# Patient Record
Sex: FEMALE | Race: BLACK OR AFRICAN AMERICAN | NOT HISPANIC OR LATINO | Employment: OTHER | URBAN - METROPOLITAN AREA
[De-identification: names, ages, dates, MRNs, and addresses within clinical notes are randomized per-mention and may not be internally consistent; named-entity substitution may affect disease eponyms.]

---

## 2023-10-21 ENCOUNTER — HOSPITAL ENCOUNTER (EMERGENCY)
Facility: HOSPITAL | Age: 78
Discharge: HOME | End: 2023-10-21
Payer: MEDICARE

## 2023-10-21 VITALS
RESPIRATION RATE: 16 BRPM | HEART RATE: 70 BPM | SYSTOLIC BLOOD PRESSURE: 162 MMHG | HEIGHT: 64 IN | OXYGEN SATURATION: 97 % | WEIGHT: 166 LBS | DIASTOLIC BLOOD PRESSURE: 85 MMHG | TEMPERATURE: 98.4 F | BODY MASS INDEX: 28.34 KG/M2

## 2023-10-21 DIAGNOSIS — R21 RASH AND OTHER NONSPECIFIC SKIN ERUPTION: Primary | ICD-10-CM

## 2023-10-21 LAB
APPEARANCE UR: CLEAR
BILIRUB UR STRIP.AUTO-MCNC: NEGATIVE MG/DL
COLOR UR: COLORLESS
GLUCOSE UR STRIP.AUTO-MCNC: ABNORMAL MG/DL
KETONES UR STRIP.AUTO-MCNC: NEGATIVE MG/DL
LEUKOCYTE ESTERASE UR QL STRIP.AUTO: NEGATIVE
NITRITE UR QL STRIP.AUTO: NEGATIVE
PH UR STRIP.AUTO: 7 [PH]
PROT UR STRIP.AUTO-MCNC: NEGATIVE MG/DL
RBC # UR STRIP.AUTO: NEGATIVE /UL
SP GR UR STRIP.AUTO: 1
UROBILINOGEN UR STRIP.AUTO-MCNC: <2 MG/DL

## 2023-10-21 PROCEDURE — 2500000004 HC RX 250 GENERAL PHARMACY W/ HCPCS (ALT 636 FOR OP/ED)

## 2023-10-21 PROCEDURE — 81003 URINALYSIS AUTO W/O SCOPE: CPT | Performed by: PHYSICIAN ASSISTANT

## 2023-10-21 PROCEDURE — 2500000001 HC RX 250 WO HCPCS SELF ADMINISTERED DRUGS (ALT 637 FOR MEDICARE OP)

## 2023-10-21 PROCEDURE — 99283 EMERGENCY DEPT VISIT LOW MDM: CPT

## 2023-10-21 RX ORDER — BACITRACIN ZINC 500 UNIT/G
OINTMENT IN PACKET (EA) TOPICAL
Status: COMPLETED
Start: 2023-10-21 | End: 2023-10-21

## 2023-10-21 RX ORDER — DEXAMETHASONE 4 MG/1
6 TABLET ORAL ONCE
Status: COMPLETED | OUTPATIENT
Start: 2023-10-21 | End: 2023-10-21

## 2023-10-21 RX ORDER — BACITRACIN ZINC 500 UNIT/G
OINTMENT IN PACKET (EA) TOPICAL ONCE
Status: COMPLETED | OUTPATIENT
Start: 2023-10-21 | End: 2023-10-21

## 2023-10-21 RX ORDER — MUPIROCIN CALCIUM 20 MG/G
1 CREAM TOPICAL 3 TIMES DAILY
Qty: 3 G | Refills: 0 | Status: SHIPPED | OUTPATIENT
Start: 2023-10-21 | End: 2023-10-31

## 2023-10-21 RX ORDER — DEXAMETHASONE 6 MG/1
TABLET ORAL
Status: COMPLETED
Start: 2023-10-21 | End: 2023-10-21

## 2023-10-21 RX ADMIN — Medication 1 APPLICATION: at 18:32

## 2023-10-21 RX ADMIN — DEXAMETHASONE 6 MG: 4 TABLET ORAL at 18:31

## 2023-10-21 RX ADMIN — BACITRACIN 1 APPLICATION: 500 OINTMENT TOPICAL at 18:32

## 2023-10-21 RX ADMIN — DEXAMETHASONE 6 MG: 6 TABLET ORAL at 18:31

## 2023-10-21 ASSESSMENT — COLUMBIA-SUICIDE SEVERITY RATING SCALE - C-SSRS
6. HAVE YOU EVER DONE ANYTHING, STARTED TO DO ANYTHING, OR PREPARED TO DO ANYTHING TO END YOUR LIFE?: NO
1. IN THE PAST MONTH, HAVE YOU WISHED YOU WERE DEAD OR WISHED YOU COULD GO TO SLEEP AND NOT WAKE UP?: NO
2. HAVE YOU ACTUALLY HAD ANY THOUGHTS OF KILLING YOURSELF?: NO

## 2023-10-21 NOTE — ED PROVIDER NOTES
HPI   Chief Complaint   Patient presents with    Rash     Pt presents to ED for KAY LE red raised itchy rash with unknown source x3 days. Airway patent. Pt verbalizes her eyes/ears feel swollen.        This is a 78-year-old female coming in for 2 areas of possible skin infection or allergic reaction as well as urinary frequency.  See MDM      History provided by:  Patient                      No data recorded                Patient History   No past medical history on file.  No past surgical history on file.  No family history on file.  Social History     Tobacco Use    Smoking status: Not on file    Smokeless tobacco: Not on file   Substance Use Topics    Alcohol use: Not on file    Drug use: Not on file       Physical Exam   ED Triage Vitals [10/21/23 1707]   Temp Heart Rate Resp BP   36.9 °C (98.4 °F) 67 20 162/85      SpO2 Temp Source Heart Rate Source Patient Position   98 % Oral -- --      BP Location FiO2 (%)     -- --       Physical Exam  Vitals and nursing note reviewed.   Constitutional:       General: She is not in acute distress.     Appearance: Normal appearance. She is not ill-appearing or toxic-appearing.   HENT:      Head: Normocephalic and atraumatic.   Eyes:      Extraocular Movements: Extraocular movements intact.      Conjunctiva/sclera: Conjunctivae normal.      Pupils: Pupils are equal, round, and reactive to light.   Cardiovascular:      Rate and Rhythm: Regular rhythm.      Pulses: Normal pulses.      Heart sounds: Normal heart sounds.   Pulmonary:      Effort: Pulmonary effort is normal. No respiratory distress.      Breath sounds: Normal breath sounds.   Abdominal:      General: Abdomen is flat. There is no distension.   Musculoskeletal:         General: Normal range of motion.      Cervical back: Normal range of motion and neck supple.   Skin:     General: Skin is warm and dry.      Comments: 2 small erythematous approximately quarter sized areas with a center pustule.  1 present to the  right posterior proximal calf just below the knee.  The other one present to the lateral posterior left lower extremity.  They are nontender to palpation.   Neurological:      General: No focal deficit present.      Mental Status: She is alert and oriented to person, place, and time.   Psychiatric:         Mood and Affect: Mood normal.         Behavior: Behavior normal.         Thought Content: Thought content normal.         ED Course & MDM   Diagnoses as of 10/21/23 1825   Rash and other nonspecific skin eruption       Medical Decision Making  Summary:  Medical Decision Making:   Patient presented as described in HPI. Patient case including ROS, PE, and treatment and plan discussed with ED attending if attached as cosigner. Results from labs and or imaging included below if completed. Liv Hoskins  is a 78 y.o. coming in for Patient presents with:  Rash: Pt presents to ED for KAY LE red raised itchy rash with unknown source x3 days. Airway patent. Pt verbalizes her eyes/ears feel swollen.  Patient is 78-year-old female coming for evaluation of 2 small erythematous pustules that are present to the legs.  She states she had one on the left forearm that is since improved.  She states that they were pruritic at first.  They have not been tender.  She denies fevers or chills.  Patient reports that she has no drainage of the area.  She is also concerned that she may be having allergic reaction that started on Thursday.  She states she has multiple allergies and feels at times like her throat is swelling in her eye swelling.  She denies any difficulty breathing.  No difficulty swallowing.  Patient's been taking Benadryl.  Urine was completed on patient as she also complains of urinary frequency.  Urine showed glucose.  Point-of-care blood glucose was completed.  Patient has multiple complaints that she is complaining about.  She states that she has had lower leg edema as well as generalized weakness to the lower legs  and believes it is related to the statin.  Patient was advised to discontinue her statin and she will be treated with topical bacitracin to her areas of complaint today as well as discharged on Bactroban.  She will be given a dose of dexamethasone here and advised close follow-up with a PCP.  .     Patient was advised to follow up with PCP or recommended provider in 2-3 days for another evaluation and exam. I advised patient/guardian to return or go to closest emergency room immediately if symptoms change, get worse, new symptoms develop prior to follow up. If there is no improvement in symptoms in the next 24 hours they are advised to return for further evaluation and exam. I also explained the plan and treatment course. Patient/guardian is in agreement with plan, treatment course, and follow up and states verbally that they will comply.    Labs Reviewed  URINALYSIS WITH REFLEX MICROSCOPIC AND CULTURE - Abnormal     Color, Urine                  Colorless (*)               Appearance, Urine             Clear                  Specific Gravity, Urine       1.003 (*)               pH, Urine                     7.0                    Protein, Urine                NEGATIVE                Glucose, Urine                >=500 (3+) (*)               Blood, Urine                  NEGATIVE                Ketones, Urine                NEGATIVE                Bilirubin, Urine              NEGATIVE                Urobilinogen, Urine           <2.0                   Nitrite, Urine                NEGATIVE                Leukocyte Esterase, Urine     NEGATIVE             URINALYSIS WITH REFLEX MICROSCOPIC AND CULTURE       Narrative: The following orders were created for panel order Urinalysis with Reflex Microscopic and Culture.                Procedure                               Abnormality         Status                                   ---------                               -----------         ------                                    Urinalysis with Reflex M...[[id number]]  Abnormal            Final result                             Extra Urine Gray Tube[[id number]]                                                                                     Please view results for these tests on the individual orders.  EXTRA URINE GRAY TUBE  POCT GLUCOSE METER   No orders to display       Tests/Medications/Escalations of Care considered but not given: As in MDM    Patient care discussed with: N/A  Social Determinants affecting care: N/A    Final diagnosis and disposition as documented       Homegoing. I discussed the differential; results and discharge plan with the patient and/or family/friend/caregiver if present.  I emphasized the importance of follow-up with the physician I referred them to in the timeframe recommended.  I explained reasons for the patient to return to the Emergency Department. They agreed that if they feel their condition is worsening or if they have any other concern they should call 911 immediately for further assistance. I gave the patient an opportunity to ask all questions they had and answered all of them accordingly. They understand return precautions and discharge instructions. The patient and/or family/friend/caregiver expressed understanding verbally and that they would comply.     Disposition: Discharge      This note has been transcribed using voice recognition and may contain grammatical errors, misplaced words, incorrect words, incorrect phrases or other errors.          Procedure  Procedures     Jonathan Tripp PA-C  10/21/23 5504

## 2023-10-21 NOTE — ED NOTES
Patient ready for discharge. Bacitracin and band aids applied to rash on legs as ordered by provider. Reviewed discharge instructions with patient. Patient states no questions or concerns at this time. Informed patient of follow-up information. Prescription given to patient. Proper medication administration reviewed and patient states no questions associated with prescription. Patient ambulatory from ED, VSS, NAD noted at this time, ABC's intact.       Gi Knott LPN  10/21/23 0431

## 2024-06-01 ENCOUNTER — APPOINTMENT (OUTPATIENT)
Dept: CARDIOLOGY | Facility: HOSPITAL | Age: 79
End: 2024-06-01
Payer: MEDICARE

## 2024-06-01 ENCOUNTER — APPOINTMENT (OUTPATIENT)
Dept: RADIOLOGY | Facility: HOSPITAL | Age: 79
End: 2024-06-01
Payer: MEDICARE

## 2024-06-01 ENCOUNTER — HOSPITAL ENCOUNTER (OUTPATIENT)
Facility: HOSPITAL | Age: 79
Setting detail: OBSERVATION
Discharge: HOME | End: 2024-06-03
Attending: EMERGENCY MEDICINE | Admitting: INTERNAL MEDICINE
Payer: MEDICARE

## 2024-06-01 DIAGNOSIS — R94.31 ABNORMAL EKG: ICD-10-CM

## 2024-06-01 DIAGNOSIS — R00.2 PALPITATIONS: Primary | ICD-10-CM

## 2024-06-01 DIAGNOSIS — E83.42 HYPOMAGNESEMIA: ICD-10-CM

## 2024-06-01 DIAGNOSIS — N39.0 ACUTE LOWER UTI: ICD-10-CM

## 2024-06-01 DIAGNOSIS — R79.89 ELEVATED TROPONIN I LEVEL: ICD-10-CM

## 2024-06-01 LAB
ALBUMIN SERPL BCP-MCNC: 3.7 G/DL (ref 3.4–5)
ALP SERPL-CCNC: 71 U/L (ref 33–136)
ALT SERPL W P-5'-P-CCNC: 14 U/L (ref 7–45)
ANION GAP SERPL CALC-SCNC: 15 MMOL/L (ref 10–20)
APPEARANCE UR: CLEAR
AST SERPL W P-5'-P-CCNC: 24 U/L (ref 9–39)
BASOPHILS # BLD AUTO: 0.06 X10*3/UL (ref 0–0.1)
BASOPHILS NFR BLD AUTO: 0.5 %
BILIRUB DIRECT SERPL-MCNC: 0.2 MG/DL (ref 0–0.3)
BILIRUB SERPL-MCNC: 0.8 MG/DL (ref 0–1.2)
BILIRUB UR STRIP.AUTO-MCNC: NEGATIVE MG/DL
BNP SERPL-MCNC: 33 PG/ML (ref 0–99)
BUN SERPL-MCNC: 11 MG/DL (ref 6–23)
CALCIUM SERPL-MCNC: 9.2 MG/DL (ref 8.6–10.3)
CARDIAC TROPONIN I PNL SERPL HS: 10 NG/L (ref 0–13)
CARDIAC TROPONIN I PNL SERPL HS: 18 NG/L (ref 0–13)
CARDIAC TROPONIN I PNL SERPL HS: 19 NG/L (ref 0–13)
CHLORIDE SERPL-SCNC: 100 MMOL/L (ref 98–107)
CO2 SERPL-SCNC: 24 MMOL/L (ref 21–32)
COLOR UR: COLORLESS
CREAT SERPL-MCNC: 0.89 MG/DL (ref 0.5–1.05)
EGFRCR SERPLBLD CKD-EPI 2021: 66 ML/MIN/1.73M*2
EOSINOPHIL # BLD AUTO: 0.15 X10*3/UL (ref 0–0.4)
EOSINOPHIL NFR BLD AUTO: 1.3 %
ERYTHROCYTE [DISTWIDTH] IN BLOOD BY AUTOMATED COUNT: 12.6 % (ref 11.5–14.5)
GLUCOSE SERPL-MCNC: 250 MG/DL (ref 74–99)
GLUCOSE UR STRIP.AUTO-MCNC: ABNORMAL MG/DL
HCT VFR BLD AUTO: 39.4 % (ref 36–46)
HGB BLD-MCNC: 11.8 G/DL (ref 12–16)
IMM GRANULOCYTES # BLD AUTO: 0.1 X10*3/UL (ref 0–0.5)
IMM GRANULOCYTES NFR BLD AUTO: 0.9 % (ref 0–0.9)
KETONES UR STRIP.AUTO-MCNC: NEGATIVE MG/DL
LEUKOCYTE ESTERASE UR QL STRIP.AUTO: ABNORMAL
LYMPHOCYTES # BLD AUTO: 2.95 X10*3/UL (ref 0.8–3)
LYMPHOCYTES NFR BLD AUTO: 25.9 %
MAGNESIUM SERPL-MCNC: 1.5 MG/DL (ref 1.6–2.4)
MCH RBC QN AUTO: 29.9 PG (ref 26–34)
MCHC RBC AUTO-ENTMCNC: 29.9 G/DL (ref 32–36)
MCV RBC AUTO: 100 FL (ref 80–100)
MONOCYTES # BLD AUTO: 0.63 X10*3/UL (ref 0.05–0.8)
MONOCYTES NFR BLD AUTO: 5.5 %
NEUTROPHILS # BLD AUTO: 7.49 X10*3/UL (ref 1.6–5.5)
NEUTROPHILS NFR BLD AUTO: 65.9 %
NITRITE UR QL STRIP.AUTO: NEGATIVE
NRBC BLD-RTO: 0 /100 WBCS (ref 0–0)
PH UR STRIP.AUTO: 6.5 [PH]
PLATELET # BLD AUTO: 459 X10*3/UL (ref 150–450)
POTASSIUM SERPL-SCNC: 4 MMOL/L (ref 3.5–5.3)
PROT SERPL-MCNC: 7.6 G/DL (ref 6.4–8.2)
PROT UR STRIP.AUTO-MCNC: NEGATIVE MG/DL
RBC # BLD AUTO: 3.94 X10*6/UL (ref 4–5.2)
RBC # UR STRIP.AUTO: NEGATIVE /UL
RBC #/AREA URNS AUTO: ABNORMAL /HPF
SODIUM SERPL-SCNC: 135 MMOL/L (ref 136–145)
SP GR UR STRIP.AUTO: 1
TSH SERPL-ACNC: 0.73 MIU/L (ref 0.44–3.98)
UROBILINOGEN UR STRIP.AUTO-MCNC: NORMAL MG/DL
WBC # BLD AUTO: 11.4 X10*3/UL (ref 4.4–11.3)
WBC #/AREA URNS AUTO: ABNORMAL /HPF

## 2024-06-01 PROCEDURE — 84443 ASSAY THYROID STIM HORMONE: CPT | Performed by: EMERGENCY MEDICINE

## 2024-06-01 PROCEDURE — 71045 X-RAY EXAM CHEST 1 VIEW: CPT | Performed by: RADIOLOGY

## 2024-06-01 PROCEDURE — 84484 ASSAY OF TROPONIN QUANT: CPT | Performed by: EMERGENCY MEDICINE

## 2024-06-01 PROCEDURE — 71045 X-RAY EXAM CHEST 1 VIEW: CPT

## 2024-06-01 PROCEDURE — 36415 COLL VENOUS BLD VENIPUNCTURE: CPT | Performed by: EMERGENCY MEDICINE

## 2024-06-01 PROCEDURE — 82248 BILIRUBIN DIRECT: CPT | Performed by: EMERGENCY MEDICINE

## 2024-06-01 PROCEDURE — 84484 ASSAY OF TROPONIN QUANT: CPT | Mod: 91 | Performed by: EMERGENCY MEDICINE

## 2024-06-01 PROCEDURE — 83735 ASSAY OF MAGNESIUM: CPT | Performed by: EMERGENCY MEDICINE

## 2024-06-01 PROCEDURE — 83880 ASSAY OF NATRIURETIC PEPTIDE: CPT | Performed by: EMERGENCY MEDICINE

## 2024-06-01 PROCEDURE — 87086 URINE CULTURE/COLONY COUNT: CPT | Mod: AHULAB | Performed by: EMERGENCY MEDICINE

## 2024-06-01 PROCEDURE — 85025 COMPLETE CBC W/AUTO DIFF WBC: CPT | Performed by: EMERGENCY MEDICINE

## 2024-06-01 PROCEDURE — 81003 URINALYSIS AUTO W/O SCOPE: CPT | Performed by: EMERGENCY MEDICINE

## 2024-06-01 PROCEDURE — 81001 URINALYSIS AUTO W/SCOPE: CPT | Performed by: EMERGENCY MEDICINE

## 2024-06-01 PROCEDURE — 99285 EMERGENCY DEPT VISIT HI MDM: CPT | Mod: 25

## 2024-06-01 PROCEDURE — 93005 ELECTROCARDIOGRAM TRACING: CPT

## 2024-06-01 PROCEDURE — 80053 COMPREHEN METABOLIC PANEL: CPT | Performed by: EMERGENCY MEDICINE

## 2024-06-01 NOTE — ED TRIAGE NOTES
Patient arrived via ambulance. The patient had been suffering an elevated BP. The patient was still hypertensive upon delivery to the er. GCS of 15, no falls or injuries.

## 2024-06-02 PROBLEM — N39.0 UTI (URINARY TRACT INFECTION): Status: ACTIVE | Noted: 2024-06-02

## 2024-06-02 PROBLEM — R00.2 PALPITATIONS: Status: ACTIVE | Noted: 2024-06-02

## 2024-06-02 LAB
ANION GAP SERPL CALC-SCNC: 11 MMOL/L (ref 10–20)
BUN SERPL-MCNC: 9 MG/DL (ref 6–23)
CALCIUM SERPL-MCNC: 9.2 MG/DL (ref 8.6–10.3)
CARDIAC TROPONIN I PNL SERPL HS: 16 NG/L (ref 0–13)
CHLORIDE SERPL-SCNC: 102 MMOL/L (ref 98–107)
CO2 SERPL-SCNC: 27 MMOL/L (ref 21–32)
CREAT SERPL-MCNC: 0.85 MG/DL (ref 0.5–1.05)
EGFRCR SERPLBLD CKD-EPI 2021: 70 ML/MIN/1.73M*2
ERYTHROCYTE [DISTWIDTH] IN BLOOD BY AUTOMATED COUNT: 12.7 % (ref 11.5–14.5)
FERRITIN SERPL-MCNC: 334 NG/ML (ref 8–150)
FOLATE SERPL-MCNC: 18.6 NG/ML
GLUCOSE BLD MANUAL STRIP-MCNC: 144 MG/DL (ref 74–99)
GLUCOSE BLD MANUAL STRIP-MCNC: 178 MG/DL (ref 74–99)
GLUCOSE BLD MANUAL STRIP-MCNC: 233 MG/DL (ref 74–99)
GLUCOSE SERPL-MCNC: 171 MG/DL (ref 74–99)
HCT VFR BLD AUTO: 37.3 % (ref 36–46)
HGB BLD-MCNC: 12.1 G/DL (ref 12–16)
HGB RETIC QN: 35 PG (ref 28–38)
IMMATURE RETIC FRACTION: 8.4 %
IRON SATN MFR SERPL: 21 % (ref 25–45)
IRON SERPL-MCNC: 59 UG/DL (ref 35–150)
MAGNESIUM SERPL-MCNC: 1.6 MG/DL (ref 1.6–2.4)
MCH RBC QN AUTO: 30.2 PG (ref 26–34)
MCHC RBC AUTO-ENTMCNC: 32.4 G/DL (ref 32–36)
MCV RBC AUTO: 93 FL (ref 80–100)
NRBC BLD-RTO: 0 /100 WBCS (ref 0–0)
PLATELET # BLD AUTO: 463 X10*3/UL (ref 150–450)
POTASSIUM SERPL-SCNC: 3.8 MMOL/L (ref 3.5–5.3)
RBC # BLD AUTO: 4.01 X10*6/UL (ref 4–5.2)
RETICS #: 0.09 X10*6/UL (ref 0.02–0.11)
RETICS/RBC NFR AUTO: 2.4 % (ref 0.5–2)
SODIUM SERPL-SCNC: 136 MMOL/L (ref 136–145)
TIBC SERPL-MCNC: 280 UG/DL (ref 240–445)
UIBC SERPL-MCNC: 221 UG/DL (ref 110–370)
VIT B12 SERPL-MCNC: 960 PG/ML (ref 211–911)
WBC # BLD AUTO: 13.3 X10*3/UL (ref 4.4–11.3)

## 2024-06-02 PROCEDURE — 83550 IRON BINDING TEST: CPT | Performed by: NURSE PRACTITIONER

## 2024-06-02 PROCEDURE — G0378 HOSPITAL OBSERVATION PER HR: HCPCS

## 2024-06-02 PROCEDURE — 83735 ASSAY OF MAGNESIUM: CPT | Performed by: NURSE PRACTITIONER

## 2024-06-02 PROCEDURE — 2500000001 HC RX 250 WO HCPCS SELF ADMINISTERED DRUGS (ALT 637 FOR MEDICARE OP): Performed by: EMERGENCY MEDICINE

## 2024-06-02 PROCEDURE — 85045 AUTOMATED RETICULOCYTE COUNT: CPT | Performed by: NURSE PRACTITIONER

## 2024-06-02 PROCEDURE — 36415 COLL VENOUS BLD VENIPUNCTURE: CPT | Performed by: NURSE PRACTITIONER

## 2024-06-02 PROCEDURE — 96372 THER/PROPH/DIAG INJ SC/IM: CPT | Mod: 59 | Performed by: INTERNAL MEDICINE

## 2024-06-02 PROCEDURE — 82607 VITAMIN B-12: CPT | Mod: AHULAB | Performed by: NURSE PRACTITIONER

## 2024-06-02 PROCEDURE — 82746 ASSAY OF FOLIC ACID SERUM: CPT | Mod: AHULAB | Performed by: NURSE PRACTITIONER

## 2024-06-02 PROCEDURE — 85027 COMPLETE CBC AUTOMATED: CPT | Performed by: NURSE PRACTITIONER

## 2024-06-02 PROCEDURE — 2500000001 HC RX 250 WO HCPCS SELF ADMINISTERED DRUGS (ALT 637 FOR MEDICARE OP): Performed by: NURSE PRACTITIONER

## 2024-06-02 PROCEDURE — 84484 ASSAY OF TROPONIN QUANT: CPT | Performed by: NURSE PRACTITIONER

## 2024-06-02 PROCEDURE — 83540 ASSAY OF IRON: CPT | Performed by: NURSE PRACTITIONER

## 2024-06-02 PROCEDURE — 2500000004 HC RX 250 GENERAL PHARMACY W/ HCPCS (ALT 636 FOR OP/ED): Performed by: EMERGENCY MEDICINE

## 2024-06-02 PROCEDURE — 82947 ASSAY GLUCOSE BLOOD QUANT: CPT | Mod: 59

## 2024-06-02 PROCEDURE — 2500000004 HC RX 250 GENERAL PHARMACY W/ HCPCS (ALT 636 FOR OP/ED): Performed by: INTERNAL MEDICINE

## 2024-06-02 PROCEDURE — 80048 BASIC METABOLIC PNL TOTAL CA: CPT | Performed by: NURSE PRACTITIONER

## 2024-06-02 PROCEDURE — 96365 THER/PROPH/DIAG IV INF INIT: CPT

## 2024-06-02 PROCEDURE — 82728 ASSAY OF FERRITIN: CPT | Mod: AHULAB | Performed by: NURSE PRACTITIONER

## 2024-06-02 PROCEDURE — 2500000001 HC RX 250 WO HCPCS SELF ADMINISTERED DRUGS (ALT 637 FOR MEDICARE OP): Performed by: INTERNAL MEDICINE

## 2024-06-02 PROCEDURE — 2500000002 HC RX 250 W HCPCS SELF ADMINISTERED DRUGS (ALT 637 FOR MEDICARE OP, ALT 636 FOR OP/ED): Performed by: NURSE PRACTITIONER

## 2024-06-02 RX ORDER — ROSUVASTATIN CALCIUM 5 MG/1
5 TABLET, COATED ORAL 3 TIMES WEEKLY
COMMUNITY

## 2024-06-02 RX ORDER — CEFTRIAXONE 1 G/50ML
1 INJECTION, SOLUTION INTRAVENOUS ONCE
Status: COMPLETED | OUTPATIENT
Start: 2024-06-02 | End: 2024-06-02

## 2024-06-02 RX ORDER — ROSUVASTATIN CALCIUM 10 MG/1
5 TABLET, COATED ORAL 3 TIMES WEEKLY
Status: DISCONTINUED | OUTPATIENT
Start: 2024-06-03 | End: 2024-06-03 | Stop reason: HOSPADM

## 2024-06-02 RX ORDER — LORAZEPAM 2 MG/ML
1 CONCENTRATE ORAL ONCE
Status: DISCONTINUED | OUTPATIENT
Start: 2024-06-02 | End: 2024-06-02

## 2024-06-02 RX ORDER — INSULIN LISPRO 100 [IU]/ML
0-10 INJECTION, SOLUTION INTRAVENOUS; SUBCUTANEOUS
Status: DISCONTINUED | OUTPATIENT
Start: 2024-06-02 | End: 2024-06-03 | Stop reason: HOSPADM

## 2024-06-02 RX ORDER — ACETAMINOPHEN 325 MG/1
650 TABLET ORAL EVERY 4 HOURS PRN
Status: DISCONTINUED | OUTPATIENT
Start: 2024-06-02 | End: 2024-06-03 | Stop reason: HOSPADM

## 2024-06-02 RX ORDER — ACETAMINOPHEN 650 MG/1
650 SUPPOSITORY RECTAL EVERY 4 HOURS PRN
Status: DISCONTINUED | OUTPATIENT
Start: 2024-06-02 | End: 2024-06-03 | Stop reason: HOSPADM

## 2024-06-02 RX ORDER — PANTOPRAZOLE SODIUM 40 MG/10ML
40 INJECTION, POWDER, LYOPHILIZED, FOR SOLUTION INTRAVENOUS
Status: DISCONTINUED | OUTPATIENT
Start: 2024-06-02 | End: 2024-06-03 | Stop reason: HOSPADM

## 2024-06-02 RX ORDER — ENOXAPARIN SODIUM 100 MG/ML
40 INJECTION SUBCUTANEOUS EVERY 24 HOURS
Status: DISCONTINUED | OUTPATIENT
Start: 2024-06-02 | End: 2024-06-03 | Stop reason: HOSPADM

## 2024-06-02 RX ORDER — METFORMIN HYDROCHLORIDE 500 MG/1
1000 TABLET ORAL
COMMUNITY

## 2024-06-02 RX ORDER — LANOLIN ALCOHOL/MO/W.PET/CERES
400 CREAM (GRAM) TOPICAL ONCE
Status: COMPLETED | OUTPATIENT
Start: 2024-06-02 | End: 2024-06-02

## 2024-06-02 RX ORDER — LORAZEPAM 1 MG/1
1 TABLET ORAL ONCE
Status: COMPLETED | OUTPATIENT
Start: 2024-06-02 | End: 2024-06-02

## 2024-06-02 RX ORDER — LOSARTAN POTASSIUM 100 MG/1
100 TABLET ORAL DAILY
COMMUNITY
Start: 2024-03-25

## 2024-06-02 RX ORDER — AMLODIPINE BESYLATE 5 MG/1
5 TABLET ORAL DAILY
COMMUNITY
Start: 2024-03-25

## 2024-06-02 RX ORDER — ONDANSETRON 4 MG/1
4 TABLET, FILM COATED ORAL EVERY 8 HOURS PRN
Status: DISCONTINUED | OUTPATIENT
Start: 2024-06-02 | End: 2024-06-03 | Stop reason: HOSPADM

## 2024-06-02 RX ORDER — CEFTRIAXONE 1 G/50ML
1 INJECTION, SOLUTION INTRAVENOUS EVERY 24 HOURS
Status: DISCONTINUED | OUTPATIENT
Start: 2024-06-03 | End: 2024-06-03

## 2024-06-02 RX ORDER — LATANOPROST 50 UG/ML
1 SOLUTION/ DROPS OPHTHALMIC NIGHTLY
COMMUNITY

## 2024-06-02 RX ORDER — TIMOLOL MALEATE 5 MG/ML
1 SOLUTION/ DROPS OPHTHALMIC DAILY
Status: DISCONTINUED | OUTPATIENT
Start: 2024-06-02 | End: 2024-06-03 | Stop reason: HOSPADM

## 2024-06-02 RX ORDER — AMLODIPINE BESYLATE 5 MG/1
5 TABLET ORAL DAILY
Status: DISCONTINUED | OUTPATIENT
Start: 2024-06-02 | End: 2024-06-03 | Stop reason: HOSPADM

## 2024-06-02 RX ORDER — LOSARTAN POTASSIUM 50 MG/1
100 TABLET ORAL DAILY
Status: DISCONTINUED | OUTPATIENT
Start: 2024-06-02 | End: 2024-06-03 | Stop reason: HOSPADM

## 2024-06-02 RX ORDER — DEXTROSE 50 % IN WATER (D50W) INTRAVENOUS SYRINGE
25
Status: DISCONTINUED | OUTPATIENT
Start: 2024-06-02 | End: 2024-06-03 | Stop reason: HOSPADM

## 2024-06-02 RX ORDER — TIMOLOL MALEATE 5 MG/ML
1 SOLUTION/ DROPS OPHTHALMIC DAILY
COMMUNITY

## 2024-06-02 RX ORDER — DEXTROSE 50 % IN WATER (D50W) INTRAVENOUS SYRINGE
12.5
Status: DISCONTINUED | OUTPATIENT
Start: 2024-06-02 | End: 2024-06-03 | Stop reason: HOSPADM

## 2024-06-02 RX ORDER — ONDANSETRON HYDROCHLORIDE 2 MG/ML
4 INJECTION, SOLUTION INTRAVENOUS EVERY 8 HOURS PRN
Status: DISCONTINUED | OUTPATIENT
Start: 2024-06-02 | End: 2024-06-03 | Stop reason: HOSPADM

## 2024-06-02 RX ORDER — ACETAMINOPHEN 160 MG/5ML
650 SOLUTION ORAL EVERY 4 HOURS PRN
Status: DISCONTINUED | OUTPATIENT
Start: 2024-06-02 | End: 2024-06-03 | Stop reason: HOSPADM

## 2024-06-02 RX ORDER — PANTOPRAZOLE SODIUM 40 MG/1
40 TABLET, DELAYED RELEASE ORAL
Status: DISCONTINUED | OUTPATIENT
Start: 2024-06-02 | End: 2024-06-03 | Stop reason: HOSPADM

## 2024-06-02 RX ORDER — LATANOPROST 50 UG/ML
1 SOLUTION/ DROPS OPHTHALMIC NIGHTLY
Status: DISCONTINUED | OUTPATIENT
Start: 2024-06-02 | End: 2024-06-03 | Stop reason: HOSPADM

## 2024-06-02 RX ORDER — ASPIRIN 325 MG
325 TABLET ORAL ONCE
Status: COMPLETED | OUTPATIENT
Start: 2024-06-02 | End: 2024-06-02

## 2024-06-02 RX ADMIN — LORAZEPAM 1 MG: 1 TABLET ORAL at 07:32

## 2024-06-02 RX ADMIN — ASPIRIN 325 MG ORAL TABLET 325 MG: 325 PILL ORAL at 03:14

## 2024-06-02 RX ADMIN — INSULIN LISPRO 4 UNITS: 100 INJECTION, SOLUTION INTRAVENOUS; SUBCUTANEOUS at 12:02

## 2024-06-02 RX ADMIN — LOSARTAN POTASSIUM 100 MG: 50 TABLET, FILM COATED ORAL at 12:02

## 2024-06-02 RX ADMIN — PANTOPRAZOLE SODIUM 40 MG: 40 TABLET, DELAYED RELEASE ORAL at 07:31

## 2024-06-02 RX ADMIN — LATANOPROST 1 DROP: 50 SOLUTION/ DROPS OPHTHALMIC at 22:04

## 2024-06-02 RX ADMIN — ENOXAPARIN SODIUM 40 MG: 40 INJECTION SUBCUTANEOUS at 07:32

## 2024-06-02 RX ADMIN — Medication 400 MG: at 03:14

## 2024-06-02 RX ADMIN — ACETAMINOPHEN 650 MG: 325 TABLET ORAL at 22:03

## 2024-06-02 RX ADMIN — ACETAMINOPHEN 650 MG: 325 TABLET ORAL at 06:59

## 2024-06-02 RX ADMIN — AMLODIPINE BESYLATE 5 MG: 5 TABLET ORAL at 12:02

## 2024-06-02 RX ADMIN — CEFTRIAXONE SODIUM 1 G: 1 INJECTION, SOLUTION INTRAVENOUS at 03:14

## 2024-06-02 SDOH — SOCIAL STABILITY: SOCIAL INSECURITY: ARE THERE ANY APPARENT SIGNS OF INJURIES/BEHAVIORS THAT COULD BE RELATED TO ABUSE/NEGLECT?: NO

## 2024-06-02 SDOH — SOCIAL STABILITY: SOCIAL INSECURITY: ARE YOU OR HAVE YOU BEEN THREATENED OR ABUSED PHYSICALLY, EMOTIONALLY, OR SEXUALLY BY ANYONE?: NO

## 2024-06-02 SDOH — SOCIAL STABILITY: SOCIAL INSECURITY: DOES ANYONE TRY TO KEEP YOU FROM HAVING/CONTACTING OTHER FRIENDS OR DOING THINGS OUTSIDE YOUR HOME?: NO

## 2024-06-02 SDOH — SOCIAL STABILITY: SOCIAL NETWORK: ARE YOU MARRIED, WIDOWED, DIVORCED, SEPARATED, NEVER MARRIED, OR LIVING WITH A PARTNER?: DIVORCED

## 2024-06-02 SDOH — SOCIAL STABILITY: SOCIAL NETWORK
DO YOU BELONG TO ANY CLUBS OR ORGANIZATIONS SUCH AS CHURCH GROUPS UNIONS, FRATERNAL OR ATHLETIC GROUPS, OR SCHOOL GROUPS?: YES

## 2024-06-02 SDOH — SOCIAL STABILITY: SOCIAL INSECURITY: HAS ANYONE EVER THREATENED TO HURT YOUR FAMILY OR YOUR PETS?: NO

## 2024-06-02 SDOH — SOCIAL STABILITY: SOCIAL INSECURITY: ABUSE: ADULT

## 2024-06-02 SDOH — SOCIAL STABILITY: SOCIAL INSECURITY: DO YOU FEEL UNSAFE GOING BACK TO THE PLACE WHERE YOU ARE LIVING?: NO

## 2024-06-02 SDOH — ECONOMIC STABILITY: INCOME INSECURITY: HOW HARD IS IT FOR YOU TO PAY FOR THE VERY BASICS LIKE FOOD, HOUSING, MEDICAL CARE, AND HEATING?: NOT HARD AT ALL

## 2024-06-02 SDOH — SOCIAL STABILITY: SOCIAL NETWORK: HOW OFTEN DO YOU ATTENT MEETINGS OF THE CLUB OR ORGANIZATION YOU BELONG TO?: MORE THAN 4 TIMES PER YEAR

## 2024-06-02 SDOH — ECONOMIC STABILITY: INCOME INSECURITY: IN THE LAST 12 MONTHS, WAS THERE A TIME WHEN YOU WERE NOT ABLE TO PAY THE MORTGAGE OR RENT ON TIME?: NO

## 2024-06-02 SDOH — ECONOMIC STABILITY: HOUSING INSECURITY
IN THE LAST 12 MONTHS, WAS THERE A TIME WHEN YOU DID NOT HAVE A STEADY PLACE TO SLEEP OR SLEPT IN A SHELTER (INCLUDING NOW)?: NO

## 2024-06-02 SDOH — SOCIAL STABILITY: SOCIAL INSECURITY: DO YOU FEEL ANYONE HAS EXPLOITED OR TAKEN ADVANTAGE OF YOU FINANCIALLY OR OF YOUR PERSONAL PROPERTY?: NO

## 2024-06-02 SDOH — ECONOMIC STABILITY: TRANSPORTATION INSECURITY
IN THE PAST 12 MONTHS, HAS LACK OF TRANSPORTATION KEPT YOU FROM MEETINGS, WORK, OR FROM GETTING THINGS NEEDED FOR DAILY LIVING?: NO

## 2024-06-02 SDOH — ECONOMIC STABILITY: FOOD INSECURITY: WITHIN THE PAST 12 MONTHS, THE FOOD YOU BOUGHT JUST DIDN'T LAST AND YOU DIDN'T HAVE MONEY TO GET MORE.: NEVER TRUE

## 2024-06-02 SDOH — HEALTH STABILITY: MENTAL HEALTH
STRESS IS WHEN SOMEONE FEELS TENSE, NERVOUS, ANXIOUS, OR CAN'T SLEEP AT NIGHT BECAUSE THEIR MIND IS TROUBLED. HOW STRESSED ARE YOU?: TO SOME EXTENT

## 2024-06-02 SDOH — ECONOMIC STABILITY: TRANSPORTATION INSECURITY
IN THE PAST 12 MONTHS, HAS THE LACK OF TRANSPORTATION KEPT YOU FROM MEDICAL APPOINTMENTS OR FROM GETTING MEDICATIONS?: NO

## 2024-06-02 SDOH — ECONOMIC STABILITY: HOUSING INSECURITY: IN THE LAST 12 MONTHS, HOW MANY PLACES HAVE YOU LIVED?: 1

## 2024-06-02 SDOH — ECONOMIC STABILITY: FOOD INSECURITY: WITHIN THE PAST 12 MONTHS, YOU WORRIED THAT YOUR FOOD WOULD RUN OUT BEFORE YOU GOT MONEY TO BUY MORE.: NEVER TRUE

## 2024-06-02 SDOH — SOCIAL STABILITY: SOCIAL NETWORK: HOW OFTEN DO YOU GET TOGETHER WITH FRIENDS OR RELATIVES?: MORE THAN THREE TIMES A WEEK

## 2024-06-02 SDOH — SOCIAL STABILITY: SOCIAL NETWORK: HOW OFTEN DO YOU ATTEND CHURCH OR RELIGIOUS SERVICES?: 1 TO 4 TIMES PER YEAR

## 2024-06-02 SDOH — HEALTH STABILITY: PHYSICAL HEALTH: ON AVERAGE, HOW MANY MINUTES DO YOU ENGAGE IN EXERCISE AT THIS LEVEL?: 20 MIN

## 2024-06-02 SDOH — SOCIAL STABILITY: SOCIAL INSECURITY: HAVE YOU HAD ANY THOUGHTS OF HARMING ANYONE ELSE?: NO

## 2024-06-02 SDOH — HEALTH STABILITY: PHYSICAL HEALTH: ON AVERAGE, HOW MANY DAYS PER WEEK DO YOU ENGAGE IN MODERATE TO STRENUOUS EXERCISE (LIKE A BRISK WALK)?: 2 DAYS

## 2024-06-02 SDOH — SOCIAL STABILITY: SOCIAL NETWORK
IN A TYPICAL WEEK, HOW MANY TIMES DO YOU TALK ON THE PHONE WITH FAMILY, FRIENDS, OR NEIGHBORS?: MORE THAN THREE TIMES A WEEK

## 2024-06-02 SDOH — SOCIAL STABILITY: SOCIAL INSECURITY: HAVE YOU HAD THOUGHTS OF HARMING ANYONE ELSE?: NO

## 2024-06-02 ASSESSMENT — COGNITIVE AND FUNCTIONAL STATUS - GENERAL
MOBILITY SCORE: 24
DAILY ACTIVITIY SCORE: 24
MOBILITY SCORE: 24
MOBILITY SCORE: 24
PATIENT BASELINE BEDBOUND: NO
DAILY ACTIVITIY SCORE: 24
DAILY ACTIVITIY SCORE: 24

## 2024-06-02 ASSESSMENT — ACTIVITIES OF DAILY LIVING (ADL)
GROOMING: INDEPENDENT
FEEDING YOURSELF: INDEPENDENT
DRESSING YOURSELF: INDEPENDENT
PATIENT'S MEMORY ADEQUATE TO SAFELY COMPLETE DAILY ACTIVITIES?: YES
JUDGMENT_ADEQUATE_SAFELY_COMPLETE_DAILY_ACTIVITIES: YES
WALKS IN HOME: INDEPENDENT
HEARING - RIGHT EAR: FUNCTIONAL
HEARING - LEFT EAR: FUNCTIONAL
BATHING: INDEPENDENT
ADEQUATE_TO_COMPLETE_ADL: YES
TOILETING: INDEPENDENT

## 2024-06-02 ASSESSMENT — LIFESTYLE VARIABLES
SKIP TO QUESTIONS 9-10: 1
HOW OFTEN DO YOU HAVE A DRINK CONTAINING ALCOHOL: MONTHLY OR LESS
HOW MANY STANDARD DRINKS CONTAINING ALCOHOL DO YOU HAVE ON A TYPICAL DAY: 1 OR 2
AUDIT-C TOTAL SCORE: 1
AUDIT-C TOTAL SCORE: 1
HOW OFTEN DO YOU HAVE 6 OR MORE DRINKS ON ONE OCCASION: NEVER

## 2024-06-02 ASSESSMENT — PAIN - FUNCTIONAL ASSESSMENT
PAIN_FUNCTIONAL_ASSESSMENT: 0-10
PAIN_FUNCTIONAL_ASSESSMENT: 0-10

## 2024-06-02 ASSESSMENT — PAIN SCALES - GENERAL
PAINLEVEL_OUTOF10: 0 - NO PAIN
PAINLEVEL_OUTOF10: 0 - NO PAIN

## 2024-06-02 ASSESSMENT — PATIENT HEALTH QUESTIONNAIRE - PHQ9
1. LITTLE INTEREST OR PLEASURE IN DOING THINGS: NOT AT ALL
SUM OF ALL RESPONSES TO PHQ9 QUESTIONS 1 & 2: 0
2. FEELING DOWN, DEPRESSED OR HOPELESS: NOT AT ALL

## 2024-06-02 NOTE — DISCHARGE INSTRUCTIONS
[facility] Observation Unit Discharge Instructions  You came to the hospital with palpitations and were admitted for observation and further care.   You were treated with cardiac monitoring.   A cardiologist specialist saw you while admitted and helped manage your care.   Your discharge plan is to go home to recover.    Please see your primary care doctor in 1 week for follow-up.   An appointment has been requested for you but may you need to call your doctors office to schedule.    Additionally, a referral has been ordered for you to follow up with Cardiology.   The office or scheduling department should call you to arrange an appointment in the next week or two.     For any issues or concerns with appointments, call the  scheduling line at [phone] or the providers office directly.       See the attached information for education about any new medications and the condition(s) you were treated for.  Your medications may have changed so pay close attention to the list given to you at discharge and ask any questions you have before leaving the hospital.

## 2024-06-02 NOTE — H&P
Liv Hoskins is a 78 y.o. female, with a PMH of palpitations, HTN, DM, glaucoma, OA, HLD, statin-related myalgias, who presented to [facility] ED on 6/1/2024 after an episode of shaking/tremors and heart racing.   Patient states she had symptoms of sinus headache/right ear pain/feeling rundown in the middle of May, took Mucinex and Flonase on PCP advise-symptoms improved.  She also reports a 2-week history of loose stools (diarrhea twice daily) as well as recent cloudy urine (did not test for UTI as outpatient).  Yesterday she had a sudden onset of heart racing, SBP elevation to 169 which is unusual for her, chills, leg shaking/tremors -EMS summoned.  She denies chest pain/pressure, SOB, syncope or lightheadedness, n/v, loss of bowel or bladder, sick contacts, travel.     In the ED, VSS with BP elevated to 172/105. EKG NSR wo ischemic changes. Labs notable for mild leukocytosis, magnesium 1.5 but otherwise baseline renal fxn and normal electrolytes, troponin 10--19--18, normal BNP and TSH, slight drop in H/H. UA +leukocytes, CXR wo acute cardiopulmonary changes. Patient was given IV abx and admitted for further evaluation.    Patient reports feeling better today, may have had an episode of palpitations last night but not since then, no further diarrhea since admission.  She has history of palpitations, has been evaluated with a Holter as outpatient x 2, without significant findings.  She feels her palpitations are stress related.  Prior holter report per epic:   Holter monitor (6/16/2022):  Sinus rhythm with frequent episodes of sinus bradycardia/tachycardia.  Max  bpm. Min HR 55 bpm. Average HR 76 bpm.     Full ROS done and negative unless listed above    Surgical history: Right thumb surgery, cataracts, abdominal laparoscopy, partial hysterectomy, right breast nodule biopsy (benign)  Social history: Denies tobacco, alcohol, illicits  Family history: Mother -angina, brother-CAD    Allergies   Allergen  "Reactions    Cherry Anaphylaxis    Shellfish Derived Anaphylaxis    Apple Hives    Bee Venom Protein (Honey Bee) Swelling    Codeine Hallucinations    Latex Hives and Swelling    Lipitor [Atorvastatin] Myalgia    Lisinopril Angioedema    Macrolide Antibiotics Swelling    Penicillins Swelling    Statins-Hmg-Coa Reductase Inhibitors Myalgia    Strawberry Hives    Zetia [Ezetimibe] Headache    Mold Rash     Current Outpatient Medications   Medication Instructions    amLODIPine (NORVASC) 5 mg, oral, Daily    latanoprost (Xalatan) 0.005 % ophthalmic solution 1 drop, Both Eyes, Nightly    losartan (COZAAR) 100 mg, oral, Daily    metFORMIN (GLUCOPHAGE) 1,000 mg, oral, Daily with breakfast    rosuvastatin (CRESTOR) 5 mg, oral, 3 times weekly    timolol (Timoptic) 0.5 % ophthalmic solution 1 drop, Both Eyes, Daily       Objective   Heart Rate:  []   Temp:  [36.6 °C (97.8 °F)-36.6 °C (97.9 °F)]   Resp:  [9-25]   BP: (112-172)/()   Height:  [162.6 cm (5' 4\")]   Weight:  [75.3 kg (166 lb)]   SpO2:  [93 %-100 %]      Pain Score: 0 - No pain   Vitals:    06/02/24 0950   Weight: 75.3 kg (166 lb)      Intake/Output Summary (Last 24 hours) at 6/2/2024 1002  Last data filed at 6/2/2024 0100  Gross per 24 hour   Intake --   Output 800 ml   Net -800 ml     Physical Exam  Constitutional: NAD, alert and cooperative  Eyes: no icterus  ENMT: mucous membranes moist, no lesions  Head/Neck: supple  Respiratory/Thorax: CTA bilaterally, non-labored breathing, no cough, on RA  Cardiovascular: RRR, no murmurs heard  Gastrointestinal: ND/S/NT  : no Pritchard, no SP/flank discomfort  Musculoskeletal: no joint swelling, ROM intact  Extremities: no edema  Neurological: non-focal  Skin: warm and dry  Psych: calm, stable mood       Medications  [START ON 6/3/2024] cefTRIAXone, 1 g, intravenous, q24h  enoxaparin, 40 mg, subcutaneous, q24h  insulin lispro, 0-10 Units, subcutaneous, TID  pantoprazole, 40 mg, oral, Daily before breakfast   " Or  pantoprazole, 40 mg, intravenous, Daily before breakfast     PRN medications: acetaminophen **OR** acetaminophen **OR** acetaminophen, dextrose, dextrose, glucagon, glucagon, ondansetron **OR** ondansetron    I review all labs and radiology results pertaining to this visit     Assessment/Plan   78 y.o. female, with a PMH of palpitations, HTN, DM, glaucoma, OA, HLD, statin-related myalgias, who presented to [facility] ED on 6/1/2024 after an episode of shaking/tremors, heart racing and elevated BP, recent sinus headache/right ear pain/feeling rundown in the middle of May, also 2-week history of loose stools twice daily as well as cloudy urine (did not test for UTI as outpatient).  Yesterday she had a sudden onset of heart racing, SBP elevation to 169 which is unusual for her, chills, leg shaking/tremors -EMS summoned.  In the ED, VSS with BP elevated to 172/105. EKG NSR wo ischemic changes. Labs notable for mild leukocytosis, magnesium 1.5 but otherwise baseline renal fxn and normal electrolytes, troponin 10--19--18, normal BNP and TSH, slight drop in H/H. UA +leukocytes, CXR wo acute cardiopulmonary changes. Patient was given IV abx and admitted for further evaluation.    Transient episode of palpitations, known prior hx palpitations (holter showing sinus tach per epic review)   Hypomagnesemia  Elevated (flat) troponin  -Monitor tele and lytes, TSH normal  -cardiology c/s, consider echo as IP or OP, has already worn holter x 2 previously   -check orthos (recent diarrhea)    Acute cystitis  Leukocytosis  -continue IV Ceftriaxone, FU urine cx     Diarrhea x 2 weeks   -none since admission, no abd pain/N/V  -check stool cx if recurs   -hold metformin     HTN, HLD  -BP improved from admission, continue home medications    DM Type II, HgbA1c 7.2%  -SSI, hypoglycemia protocol  -metformin on hold     Other comorbidities as above  -Continue meds as ordered and adjust based on clinical course     GI/VTE PPX: PPI, SQ  Lovenox    Disposition: Discharge home once medically cleared and stable    Daisy Mayo CNP

## 2024-06-02 NOTE — ED PROVIDER NOTES
HPI   Chief Complaint   Patient presents with   • Hypertension       HPI: []  78-year-old  female with a history of hypertension, diabetes today comes in with palpitations.  She states that the today she developed severe palpitations and started shaking really bad.  She had no chest pain pressure heaviness.  No shortness breath.  No syncope or near syncope no nausea vomit diarrhea no fever no chills no back pain or flank pain no hematuria no hematemesis melena hematochezia no recent travel hospitalization or antibiotic use.  Not a smoker.    Past history: Hypertension, diabetes  Social: Patient denies current tobacco alcohol drug abuse.  REVIEW OF SYSTEMS:    GENERAL.: No weight loss, fatigue, anorexia, insomnia, fever.    EYES: No vision loss, double vision, drainage, eye pain.    ENT: No pharyngitis, dry mouth.    CARDIOPULMONARY: No chest pain, positive for palpitations, syncope, near syncope. No shortness of breath, cough, hemoptysis.    GI: No abdominal pain, change in bowel habits, melena, hematemesis, hematochezia, nausea, vomiting, diarrhea.    : No discharge, dysuria, frequency, urgency, hematuria.    MS: No limb pain, joint pain, joint swelling.    SKIN: No rashes.    PSYCH: No depression, anxiety, suicidality, homicidality.    Review of systems is otherwise negative unless stated above or in history of present illness.  Social history, family history, allergies reviewed.  PHYSICAL EXAM:    GENERAL: Vitals noted, no distress. Alert and oriented  x 3. Non-toxic.  Somewhat anxious    EENT: TMs clear. Posterior oropharynx unremarkable. No meningismus. No LAD.     NECK: Supple. Nontender. No midline tenderness.     CARDIAC: Regular, rate, rhythm. No murmurs rubs or gallops. No JVD    PULMONARY: Lungs clear bilaterally with good aeration. No wheezes rales or rhonchi. No respiratory distress.  No tachypnea stridor or retractions able to speak in full sentences    ABDOMEN: Soft, nonsurgical. Nontender.  No peritoneal signs. Normoactive bowel sounds. No pulsatile masses.  Negative CVA tenderness    EXTREMITIES: No peripheral edema. Negative Homans bilaterally, no cords.  2+ bounding pulses well-perfused    SKIN: No rash. Intact.     NEURO: No focal neurologic deficits, NIH score of 0. Cranial nerves normal as tested from II through XII.     MEDICAL DECISION MAKING:  EKG on my interpretation shows normal sinus rhythm normal axis rate mid 80s with no acute ischemic changes.  CBC with differential shows a mildly cytosis 11.4 chemistries are unremarkable magnesium is low 1.5 UA shows UTI troponin x 3 showed slightly uptrend initially was 10, then it was 19 then 18.  Chest x-ray negative.    Treatment in the ED: IV established, urine sent for culture, given IV fluids oral magnesium oxide and aspirin and IV Rocephin.    ED course: Patient remained stable hemodynamic.    Impression: #1 palpitations, #2 demand ischemia myocardium, #3 hypomagnesemia, #4 urine tract infection    Plans and MDM: 78-year-old female history of hypertension diabetes presented palpitations has a very subtle elevations of troponin which most likely is demand ischemia myocardium rather than a type I MI, EKG is reassuring, has UTI uncomplicated low suspicion for pyelonephritis or systemic infection or bacteremia, patient will be hospitalized for further care.                          Devan Coma Scale Score: 15                     Patient History   No past medical history on file.  No past surgical history on file.  No family history on file.  Social History     Tobacco Use   • Smoking status: Not on file   • Smokeless tobacco: Not on file   Substance Use Topics   • Alcohol use: Not on file   • Drug use: Not on file       Physical Exam   ED Triage Vitals   Temperature Heart Rate Respirations BP   06/01/24 2010 06/01/24 1931 06/01/24 1931 06/01/24 1931   36.6 °C (97.8 °F) 92 20 171/77      Pulse Ox Temp Source Heart Rate Source Patient Position    06/01/24 1931 06/01/24 2010 -- 06/01/24 1931   100 % Temporal  Sitting      BP Location FiO2 (%)     06/01/24 1931 --     Left arm        Physical Exam    ED Course & [facility]   ED Course as of 06/02/24 0315   Sun Jun 02, 202402, 2024 0310 EKG on my interpretation shows normal sinus rhythm normal axis rate mid 80s with no ischemic changes.  Initial troponin was 10-second was 19/3rd was 18 magnesium was low 1.5, UA shows a possible UTI chest x-ray negative patient was given aspirin and IV fluids IV Rocephin and will be hospitalized for further care. [MT]      ED Course User Index  [MT] Berna Lackey MD         Diagnoses as of 06/02/24 0315   Palpitations   Elevated troponin I level   Acute lower UTI   Hypomagnesemia       Medical Decision Making      Procedure  Procedures     Berna Lackey MD  06/02/24 0315

## 2024-06-03 ENCOUNTER — APPOINTMENT (OUTPATIENT)
Dept: CARDIOLOGY | Facility: HOSPITAL | Age: 79
End: 2024-06-03
Payer: MEDICARE

## 2024-06-03 VITALS
HEIGHT: 64 IN | BODY MASS INDEX: 28.34 KG/M2 | WEIGHT: 166 LBS | HEART RATE: 78 BPM | TEMPERATURE: 98.8 F | RESPIRATION RATE: 18 BRPM | OXYGEN SATURATION: 97 % | SYSTOLIC BLOOD PRESSURE: 135 MMHG | DIASTOLIC BLOOD PRESSURE: 62 MMHG

## 2024-06-03 LAB
ANION GAP SERPL CALC-SCNC: 14 MMOL/L (ref 10–20)
AORTIC VALVE PEAK VELOCITY: 1.13 M/S
AV PEAK GRADIENT: 5.1 MMHG
AVA (PEAK VEL): 1.83 CM2
BACTERIA UR CULT: NORMAL
BODY SURFACE AREA: 1.84 M2
BUN SERPL-MCNC: 13 MG/DL (ref 6–23)
CALCIUM SERPL-MCNC: 9.4 MG/DL (ref 8.6–10.3)
CHLORIDE SERPL-SCNC: 101 MMOL/L (ref 98–107)
CO2 SERPL-SCNC: 24 MMOL/L (ref 21–32)
CREAT SERPL-MCNC: 0.98 MG/DL (ref 0.5–1.05)
EGFRCR SERPLBLD CKD-EPI 2021: 59 ML/MIN/1.73M*2
EJECTION FRACTION APICAL 4 CHAMBER: 57.9
ERYTHROCYTE [DISTWIDTH] IN BLOOD BY AUTOMATED COUNT: 12.9 % (ref 11.5–14.5)
GLUCOSE BLD MANUAL STRIP-MCNC: 112 MG/DL (ref 74–99)
GLUCOSE BLD MANUAL STRIP-MCNC: 218 MG/DL (ref 74–99)
GLUCOSE BLD MANUAL STRIP-MCNC: 273 MG/DL (ref 74–99)
GLUCOSE SERPL-MCNC: 261 MG/DL (ref 74–99)
HCT VFR BLD AUTO: 40 % (ref 36–46)
HGB BLD-MCNC: 12.8 G/DL (ref 12–16)
LEFT ATRIUM VOLUME AREA LENGTH INDEX BSA: 20.2 ML/M2
LEFT VENTRICLE INTERNAL DIMENSION DIASTOLE: 4.08 CM (ref 3.5–6)
LEFT VENTRICULAR OUTFLOW TRACT DIAMETER: 1.8 CM
LV EJECTION FRACTION BIPLANE: 60 %
MCH RBC QN AUTO: 30.5 PG (ref 26–34)
MCHC RBC AUTO-ENTMCNC: 32 G/DL (ref 32–36)
MCV RBC AUTO: 96 FL (ref 80–100)
MITRAL VALVE E/A RATIO: 0.62
NRBC BLD-RTO: 0 /100 WBCS (ref 0–0)
PLATELET # BLD AUTO: 460 X10*3/UL (ref 150–450)
POTASSIUM SERPL-SCNC: 4.2 MMOL/L (ref 3.5–5.3)
RBC # BLD AUTO: 4.19 X10*6/UL (ref 4–5.2)
RIGHT VENTRICLE PEAK SYSTOLIC PRESSURE: 27.5 MMHG
SODIUM SERPL-SCNC: 135 MMOL/L (ref 136–145)
TRICUSPID ANNULAR PLANE SYSTOLIC EXCURSION: 1.8 CM
WBC # BLD AUTO: 11.2 X10*3/UL (ref 4.4–11.3)

## 2024-06-03 PROCEDURE — 93306 TTE W/DOPPLER COMPLETE: CPT

## 2024-06-03 PROCEDURE — 2500000004 HC RX 250 GENERAL PHARMACY W/ HCPCS (ALT 636 FOR OP/ED): Performed by: NURSE PRACTITIONER

## 2024-06-03 PROCEDURE — 93306 TTE W/DOPPLER COMPLETE: CPT | Performed by: INTERNAL MEDICINE

## 2024-06-03 PROCEDURE — 85027 COMPLETE CBC AUTOMATED: CPT | Performed by: NURSE PRACTITIONER

## 2024-06-03 PROCEDURE — 2500000001 HC RX 250 WO HCPCS SELF ADMINISTERED DRUGS (ALT 637 FOR MEDICARE OP): Performed by: NURSE PRACTITIONER

## 2024-06-03 PROCEDURE — 2500000002 HC RX 250 W HCPCS SELF ADMINISTERED DRUGS (ALT 637 FOR MEDICARE OP, ALT 636 FOR OP/ED): Performed by: NURSE PRACTITIONER

## 2024-06-03 PROCEDURE — G0378 HOSPITAL OBSERVATION PER HR: HCPCS

## 2024-06-03 PROCEDURE — 96372 THER/PROPH/DIAG INJ SC/IM: CPT | Performed by: INTERNAL MEDICINE

## 2024-06-03 PROCEDURE — 2500000001 HC RX 250 WO HCPCS SELF ADMINISTERED DRUGS (ALT 637 FOR MEDICARE OP): Performed by: INTERNAL MEDICINE

## 2024-06-03 PROCEDURE — 99222 1ST HOSP IP/OBS MODERATE 55: CPT | Performed by: INTERNAL MEDICINE

## 2024-06-03 PROCEDURE — 93242 EXT ECG>48HR<7D RECORDING: CPT

## 2024-06-03 PROCEDURE — 2500000004 HC RX 250 GENERAL PHARMACY W/ HCPCS (ALT 636 FOR OP/ED): Performed by: INTERNAL MEDICINE

## 2024-06-03 PROCEDURE — 82947 ASSAY GLUCOSE BLOOD QUANT: CPT

## 2024-06-03 PROCEDURE — 80048 BASIC METABOLIC PNL TOTAL CA: CPT | Performed by: NURSE PRACTITIONER

## 2024-06-03 PROCEDURE — 36415 COLL VENOUS BLD VENIPUNCTURE: CPT | Performed by: NURSE PRACTITIONER

## 2024-06-03 PROCEDURE — 87506 IADNA-DNA/RNA PROBE TQ 6-11: CPT | Mod: AHULAB | Performed by: NURSE PRACTITIONER

## 2024-06-03 RX ORDER — POLYETHYLENE GLYCOL 3350 17 G/17G
17 POWDER, FOR SOLUTION ORAL DAILY
Status: DISCONTINUED | OUTPATIENT
Start: 2024-06-03 | End: 2024-06-03 | Stop reason: HOSPADM

## 2024-06-03 RX ORDER — BISMUTH SUBSALICYLATE 262 MG
1 TABLET,CHEWABLE ORAL DAILY
COMMUNITY

## 2024-06-03 RX ORDER — CETIRIZINE HYDROCHLORIDE 10 MG/1
10 TABLET ORAL DAILY
COMMUNITY

## 2024-06-03 RX ORDER — CYANOCOBALAMIN (VITAMIN B-12) 250 MCG
250 TABLET ORAL DAILY
COMMUNITY

## 2024-06-03 RX ADMIN — LOSARTAN POTASSIUM 100 MG: 50 TABLET, FILM COATED ORAL at 08:51

## 2024-06-03 RX ADMIN — ENOXAPARIN SODIUM 40 MG: 40 INJECTION SUBCUTANEOUS at 06:47

## 2024-06-03 RX ADMIN — AMLODIPINE BESYLATE 5 MG: 5 TABLET ORAL at 08:51

## 2024-06-03 RX ADMIN — PANTOPRAZOLE SODIUM 40 MG: 40 TABLET, DELAYED RELEASE ORAL at 06:47

## 2024-06-03 RX ADMIN — POLYETHYLENE GLYCOL 3350 17 G: 17 POWDER, FOR SOLUTION ORAL at 11:36

## 2024-06-03 RX ADMIN — TIMOLOL MALEATE 1 DROP: 5 SOLUTION/ DROPS OPHTHALMIC at 08:56

## 2024-06-03 RX ADMIN — CEFTRIAXONE SODIUM 1 G: 1 INJECTION, SOLUTION INTRAVENOUS at 04:20

## 2024-06-03 RX ADMIN — INSULIN LISPRO 6 UNITS: 100 INJECTION, SOLUTION INTRAVENOUS; SUBCUTANEOUS at 09:57

## 2024-06-03 RX ADMIN — ROSUVASTATIN 5 MG: 10 TABLET, FILM COATED ORAL at 08:51

## 2024-06-03 ASSESSMENT — COGNITIVE AND FUNCTIONAL STATUS - GENERAL
DAILY ACTIVITIY SCORE: 24
MOBILITY SCORE: 24

## 2024-06-03 ASSESSMENT — PAIN SCALES - GENERAL
PAINLEVEL_OUTOF10: 0 - NO PAIN
PAINLEVEL_OUTOF10: 0 - NO PAIN

## 2024-06-03 ASSESSMENT — ACTIVITIES OF DAILY LIVING (ADL): LACK_OF_TRANSPORTATION: NO

## 2024-06-03 NOTE — CARE PLAN
The patient's goals for the shift include      The clinical goals for the shift include Patient will remain HDS, safe and free from fall/injury this shift.

## 2024-06-03 NOTE — PROGRESS NOTES
"Liv Hoskins is a 78 y.o. female on day 0 of admission presenting with Palpitations.    Subjective   Patient seen in room alone. She denies any palpitations, CP, SOB, HA, urinary symptoms, abdominal pain. She states she feels better since being admitted.       Objective     Physical Exam  Constitutional:       Appearance: Normal appearance.   HENT:      Head: Normocephalic and atraumatic.      Nose: Nose normal.   Cardiovascular:      Rate and Rhythm: Normal rate and regular rhythm.   Pulmonary:      Effort: Pulmonary effort is normal. No respiratory distress.      Breath sounds: Normal breath sounds. No wheezing or rales.   Abdominal:      General: Bowel sounds are normal. There is no distension.      Palpations: Abdomen is soft.      Tenderness: There is no abdominal tenderness. There is no guarding.   Musculoskeletal:         General: Normal range of motion.      Cervical back: Normal range of motion.      Right lower leg: No edema.      Left lower leg: No edema.   Skin:     General: Skin is warm and dry.   Neurological:      General: No focal deficit present.      Mental Status: She is alert.   Psychiatric:         Mood and Affect: Mood normal.         Behavior: Behavior normal.         Last Recorded Vitals  Blood pressure 135/62, pulse 78, temperature 37.1 °C (98.8 °F), temperature source Temporal, resp. rate 18, height 1.626 m (5' 4\"), weight 75.3 kg (166 lb), SpO2 97%.  Intake/Output last 3 Shifts:  I/O last 3 completed shifts:  In: 50 (0.7 mL/kg) [IV Piggyback:50]  Out: 800 (10.6 mL/kg) [Urine:800 (0.3 mL/kg/hr)]  Weight: 75.3 kg     Relevant Results  Scheduled medications  amLODIPine, 5 mg, oral, Daily  enoxaparin, 40 mg, subcutaneous, q24h  insulin lispro, 0-10 Units, subcutaneous, TID  latanoprost, 1 drop, Both Eyes, Nightly  losartan, 100 mg, oral, Daily  pantoprazole, 40 mg, oral, Daily before breakfast   Or  pantoprazole, 40 mg, intravenous, Daily before breakfast  polyethylene glycol, 17 g, oral, " Daily  rosuvastatin, 5 mg, oral, Once per day on Monday Wednesday Friday  timolol, 1 drop, Both Eyes, Daily      Continuous medications     PRN medications  PRN medications: acetaminophen **OR** acetaminophen **OR** acetaminophen, dextrose, dextrose, glucagon, glucagon, ondansetron **OR** ondansetron  ECG 12 lead    Result Date: 6/3/2024  Normal sinus rhythm Normal ECG When compared with ECG of 01-MAY-2023 13:51, Previous ECG has undetermined rhythm, needs review T wave inversion now evident in Anterior leads    XR chest 1 view    Result Date: 6/1/2024  Interpreted By:  Claudy Manning, STUDY: XR CHEST 1 VIEW;  6/1/2024 9:01 pm   INDICATION: Signs/Symptoms:palpitatons.   COMPARISON: 05/01/2023   ACCESSION NUMBER(S): [id number]   ORDERING CLINICIAN: JASVIR JAY   FINDINGS:         CARDIOMEDIASTINAL SILHOUETTE: Cardiomediastinal silhouette is normal in size and configuration.   LUNGS: Lungs are clear.   ABDOMEN: No remarkable upper abdominal findings.   BONES: No acute osseous changes.       1.  No evidence of acute cardiopulmonary process.       MACRO: None   Signed by: Claudy Manning 6/1/2024 9:16 PM Dictation workstation:   [id number]   Results for orders placed or performed during the hospital encounter of 06/01/24 (from the past 24 hour(s))   POCT GLUCOSE   Result Value Ref Range    POCT Glucose 178 (H) 74 - 99 mg/dL   Basic metabolic panel   Result Value Ref Range    Glucose 261 (H) 74 - 99 mg/dL    Sodium 135 (L) 136 - 145 mmol/L    Potassium 4.2 3.5 - 5.3 mmol/L    Chloride 101 98 - 107 mmol/L    Bicarbonate 24 21 - 32 mmol/L    Anion Gap 14 10 - 20 mmol/L    Urea Nitrogen 13 6 - 23 mg/dL    Creatinine 0.98 0.50 - 1.05 mg/dL    eGFR 59 (L) >60 mL/min/1.73m*2    Calcium 9.4 8.6 - 10.3 mg/dL   CBC   Result Value Ref Range    WBC 11.2 4.4 - 11.3 x10*3/uL    nRBC 0.0 0.0 - 0.0 /100 WBCs    RBC 4.19 4.00 - 5.20 x10*6/uL    Hemoglobin 12.8 12.0 - 16.0 g/dL    Hematocrit 40.0 36.0 - 46.0 %    MCV 96 80 - 100  fL    MCH 30.5 26.0 - 34.0 pg    MCHC 32.0 32.0 - 36.0 g/dL    RDW 12.9 11.5 - 14.5 %    Platelets 460 (H) 150 - 450 x10*3/uL   POCT GLUCOSE   Result Value Ref Range    POCT Glucose 273 (H) 74 - 99 mg/dL   POCT GLUCOSE   Result Value Ref Range    POCT Glucose 112 (H) 74 - 99 mg/dL   POCT GLUCOSE   Result Value Ref Range    POCT Glucose 218 (H) 74 - 99 mg/dL   Transthoracic Echo (TTE) Complete   Result Value Ref Range    BSA 1.84 m2   Holter Or Event Cardiac Monitor   Result Value Ref Range    BSA 1.84 m2         Assessment/Plan   Principal Problem:    Palpitations  Active Problems:    UTI (urinary tract infection)    78 y.o. female, with a PMH of palpitations, HTN, DM, glaucoma, OA, HLD, statin-related myalgias, who presented to [facility] ED on 6/1/2024 after an episode of shaking/tremors, heart racing and elevated BP, recent sinus headache/right ear pain/feeling rundown in the middle of May, also 2-week history of loose stools twice daily as well as cloudy urine (did not test for UTI as outpatient).  Yesterday she had a sudden onset of heart racing, SBP elevation to 169 which is unusual for her, chills, leg shaking/tremors -EMS summoned.  In the ED, VSS with BP elevated to 172/105. EKG NSR wo ischemic changes. Labs notable for mild leukocytosis, magnesium 1.5 but otherwise baseline renal fxn and normal electrolytes, troponin 10--19--18, normal BNP and TSH, slight drop in H/H. UA +leukocytes, CXR wo acute cardiopulmonary changes. Patient was given IV abx and admitted for further evaluation.     Transient episode of palpitations, known prior hx palpitations (holter showing sinus tach per epic review)   Hypomagnesemia  Elevated (flat) troponin  -Monitor tele and lytes, TSH normal  -cardiology following: Palpitations likely due to a benign tachy-arrhythmia in the setting of electrolyte abnormalities  -two week event monitor  -TTE  -follow up outpatient cardiology      Acute cystitis  Leukocytosis  -IV ceftriaxone  stopped due to no urinary symptoms and negative culture     Diarrhea x 2 weeks   -none since admission, no abd pain/N/V  -check stool cx if recurs   -hold metformin      HTN, HLD  -BP improved from admission, continue home medications     DM Type II, HgbA1c 7.2%  -SSI, hypoglycemia protocol  -metformin on hold      Other comorbidities as above  -Continue meds as ordered and adjust based on clinical course      GI/VTE PPX: PPI, SQ Lovenox     Disposition: Discharge home once medically cleared and stable    Labs/Testing reviewed    Interdisciplinary team rounding completed with hospitalist, nurse, TCC    PA discussed plan and lab/testing results with Dr. Arevalo           I spent 45 minutes in the professional and overall care of this patient.      JHONY HammC

## 2024-06-03 NOTE — CARE PLAN
The patient's goals for the shift include  seeing the cardiologist    The clinical goals for the shift include free from falls

## 2024-06-03 NOTE — HOSPITAL COURSE
Liv Hoskins is a 78 y.o. female, with a PMH of palpitations, HTN, DM, glaucoma, OA, HLD, statin-related myalgias, who presented to [facility] ED on 6/1/2024 after an episode of shaking/tremors and heart racing.   Patient states she had symptoms of sinus headache/right ear pain/feeling rundown in the middle of May, took Mucinex and Flonase on PCP advise-symptoms improved.  She also reports a 2-week history of loose stools (diarrhea twice daily) as well as recent cloudy urine (did not test for UTI as outpatient).  Yesterday she had a sudden onset of heart racing, SBP elevation to 169 which is unusual for her, chills, leg shaking/tremors -EMS summoned.  She denies chest pain/pressure, SOB, syncope or lightheadedness, n/v, loss of bowel or bladder, sick contacts, travel.      In the ED, VSS with BP elevated to 172/105. EKG NSR wo ischemic changes. Labs notable for mild leukocytosis, magnesium 1.5 but otherwise baseline renal fxn and normal electrolytes, troponin 10--19--18, normal BNP and TSH, slight drop in H/H. UA +leukocytes, CXR wo acute cardiopulmonary changes. Patient was given IV abx and admitted for further evaluation.     Patient reports feeling better today, may have had an episode of palpitations last night but not since then, no further diarrhea since admission.  She has history of palpitations, has been evaluated with a Holter as outpatient x 2, without significant findings.  She feels her palpitations are stress related.  Prior holter report per epic:   Holter monitor (6/16/2022):  Sinus rhythm with frequent episodes of sinus bradycardia/tachycardia.  Max  bpm. Min HR 55 bpm. Average HR 76 bpm.     While in OBS, pt was seen by cardiology who determined the palpitations were likely due to a benign tachy-arrhythmia in the setting of electrolyte abnormalities. Patient cleared for discharge from cardiology perspective with TTE, two week event monitor, and OP cardiology FU.     Patient was also being  treated for acute cystitis, but culture resulted negative and patient was not having urinary symptoms so abx were stopped.     Patient to follow up with PCP OP.    Labs/Testing reviewed    Interdisciplinary team rounding completed with hospitalist, nurse, TCC    PA discussed plan and lab/testing results with Dr. Arevalo prior to discharge.         Acute cystitis  Leukocytosis  -continue IV Ceftriaxone, FU urine cx      Diarrhea x 2 weeks   -none since admission, no abd pain/N/V  -check stool cx if recurs   -hold metformin      HTN, HLD  -BP improved from admission, continue home medications     DM Type II, HgbA1c 7.2%  -SSI, hypoglycemia protocol  -metformin on hold      Other comorbidities as above  -Continue meds as ordered and adjust based on clinical course      GI/VTE PPX: PPI, SQ Lovenox     Disposition: Discharge home once medically cleared and stable

## 2024-06-03 NOTE — PROGRESS NOTES
06/03/24 0921   Current Planned Discharge Disposition   Current Planned Discharge Disposition Home

## 2024-06-03 NOTE — PROGRESS NOTES
Pharmacy Medication History Review    Liv Hoskins is a 78 y.o. female admitted for Palpitations. Pharmacy reviewed the patient's [id number]-sg-[id number] medications and allergies for accuracy.    The list below reflectives the updated PTA list. Please review each medication in order reconciliation for additional clarification and justification.  Prior to Admission Medications   Prescriptions Last Dose Informant Patient Reported? Taking?   amLODIPine (Norvasc) 5 mg tablet 6/1/2024  Yes Yes   Sig: Take 1 tablet (5 mg) by mouth once daily.   cetirizine (ZyrTEC) 10 mg tablet   Yes No   Sig: Take 1 tablet (10 mg) by mouth once daily.   cyanocobalamin (Vitamin B-12) 250 mcg tablet   Yes No   Sig: Take 1 tablet (250 mcg) by mouth once daily.   latanoprost (Xalatan) 0.005 % ophthalmic solution   Yes No   Sig: Administer 1 drop into both eyes once daily at bedtime.   losartan (Cozaar) 100 mg tablet 6/1/2024  Yes Yes   Sig: Take 1 tablet (100 mg) by mouth once daily.   metFORMIN (Glucophage) 500 mg tablet   Yes No   Sig: Take 2 tablets (1,000 mg) by mouth once daily with breakfast.   multivitamin tablet   Yes No   Sig: Take 1 tablet by mouth once daily.   rosuvastatin (Crestor) 5 mg tablet   Yes No   Sig: Take 1 tablet (5 mg) by mouth 3 (three) times a week.   timolol (Timoptic) 0.5 % ophthalmic solution   Yes No   Sig: Administer 1 drop into both eyes once daily.      Facility-Administered Medications: None          The list below reflectives the updated allergy list. Please review each documented allergy for additional clarification and justification.  Allergies  Reviewed by Marcelle López LPN on 6/2/2024        Severity Reactions Comments    Cherry High Anaphylaxis     Shellfish Derived High Anaphylaxis     Apple Not Specified Hives     Bee Venom Protein (honey Bee) Not Specified Swelling     Codeine Not Specified Hallucinations     Latex Not Specified Hives, Swelling     Lipitor [atorvastatin] Not Specified  Myalgia     Lisinopril Not Specified Angioedema     Macrolide Antibiotics Not Specified Swelling     Penicillins Not Specified Swelling     Statins-hmg-coa Reductase Inhibitors Not Specified Myalgia     Strawberry Not Specified Hives     Zetia [ezetimibe] Not Specified Headache     Mold Low Rash             Below are additional concerns with the patient's PTA list.  Spoke to patient about medications prior to admission     Valentina Restrepo, PharmD

## 2024-06-03 NOTE — PROGRESS NOTES
06/03/24 0918   Discharge Planning   Living Arrangements Alone   Support Systems Family members;Children   Assistance Needed None   Type of Residence Private residence   Number of Stairs to Enter Residence 0   Number of Stairs Within Residence 0   Do you have animals or pets at home? No   Who is requesting discharge planning? Provider   Home or Post Acute Services None   Patient expects to be discharged to: Home   Does the patient need discharge transport arranged? Yes   RoundTrip coordination needed? Yes   Financial Resource Strain   How hard is it for you to pay for the very basics like food, housing, medical care, and heating? Not hard   Housing Stability   In the last 12 months, was there a time when you were not able to pay the mortgage or rent on time? N   In the last 12 months, how many places have you lived? 1   In the last 12 months, was there a time when you did not have a steady place to sleep or slept in a shelter (including now)? N   Transportation Needs   In the past 12 months, has lack of transportation kept you from medical appointments or from getting medications? no   In the past 12 months, has lack of transportation kept you from meetings, work, or from getting things needed for daily living? No     Met with patient at bedside to  discuss her preferences for care upon discharge. Discussed how patient manages health at home. Lives alone in an apartment.  She is a retired elementarty .  Independent in all ADLs.  Drives and is able to get herself to all of her appointments. No home O2, dialysis, or assistive devices.  No additional resources or needs identified. Reviewed today's plan of care.  Patient verbalized understanding as evidenced by teach back method. Patient plans to return home at discharge & follow up with her PCP.    Patient is travelling to [place] on 6/15 for her granddaughter's 8th grade graduation.  Patient is allergic to shellfish and peanuts.  VIJAY Salinas  RN TCC

## 2024-06-03 NOTE — CONSULTS
"Inpatient consult to Cardiology  Consult performed by: Kari Kelley, CHINYERE-CNP  Consult ordered by: Austyn Cummings DO  Reason for consult: palpitations        History Of Present Illness:    Liv Hoskins is a 78 y.o. female presenting for evaluation of palpitations. She reports on Saturday around 7:30 pm while writing out cards she felt her heart rate racing \"pounding out of her chest\" and right leg was shaking. This persisted for 5 minutes. EMS was called. She does admit to over the past couple weeks having palpitations a couple times a week. She had been told by her primary care physician 5/16 to to take Flonase for sinus symptoms and ear pain. She also had been having diarrhea. Denies chest pain, dyspnea, orthopnea, pnd, lightheadedness, dizziness, syncope,lower extremity edema, or bleeding issues.     She is followed by cardiologist Dr. Michael Christensen at [facility]       Last Recorded Vitals:  Vitals:    06/03/24 0505 06/03/24 0934 06/03/24 0936 06/03/24 0937   BP: 123/79 127/58 134/78 149/73   BP Location: Right arm Right arm Right arm Right arm   Patient Position: Lying Lying Sitting Standing   Pulse: 66 70 78 81   Resp: 18 18 18 18   Temp: 36.5 °C (97.7 °F) 36.3 °C (97.3 °F)     TempSrc: Temporal Temporal     SpO2: 99% 97% 97% 97%   Weight:       Height:           Last Labs:  LABS:  CMP:  Results from last 7 days   Lab Units 06/03/24  0854 06/02/24  0849 06/01/24 2009   SODIUM mmol/L 135* 136 135*   POTASSIUM mmol/L 4.2 3.8 4.0   CHLORIDE mmol/L 101 102 100   CO2 mmol/L 24 27 24   ANION GAP mmol/L 14 11 15   BUN mg/dL 13 9 11   CREATININE mg/dL 0.98 0.85 0.89   EGFR mL/min/1.73m*2 59* 70 66   MAGNESIUM mg/dL  --  1.60 1.50*   ALBUMIN g/dL  --   --  3.7   ALT U/L  --   --  14   AST U/L  --   --  24   BILIRUBIN TOTAL mg/dL  --   --  0.8     CBC:  Results from last 7 days   Lab Units 06/03/24  0854 06/02/24  0849 06/01/24 2009   WBC AUTO x10*3/uL 11.2 13.3* 11.4*   HEMOGLOBIN g/dL 12.8 12.1 11.8* " "  HEMATOCRIT % 40.0 37.3 39.4   PLATELETS AUTO x10*3/uL 460* 463* 459*   MCV fL 96 93 100     COAG:     ABO: No results found for: \"ABO\"  HEME/ENDO:  Results from last 7 days   Lab Units 06/02/24  0849 06/01/24 2009   FERRITIN ng/mL 334*  --    IRON SATURATION % 21*  --    TSH mIU/L  --  0.73      CARDIAC:   Results from last 7 days   Lab Units 06/02/24  0849 06/01/24 2227 06/01/24 2126 06/01/24 2009   TROPHS ng/L 16* 18* 19* 10   BNP pg/mL  --   --   --  33   No results for input(s): \"CHOL\", \"LDLF\", \"LDL\", \"LDLCALC\", \"HDL\", \"TRIG\" in the last 95343 hours.  Last I/O:  I/O last 3 completed shifts:  In: - (0 mL/kg)   Out: 800 (10.6 mL/kg) [Urine:800 (0.3 mL/kg/hr)]  Weight: 75.3 kg     Past Cardiology Tests (Last 3 Years):  EKG:  Reviewed EKG from 6/1/24- normal sinus rhythm HR 88 with first degree AV block    Echo:  No results found for this or any previous visit from the past 1095 days.    Ejection Fractions:  No results found for: \"EF\"  Cath:  No results found for this or any previous visit from the past 1095 days.    Stress Test:  No results found for this or any previous visit from the past 1095 days.    Cardiac Imaging:    Holter monitor [facility] 6/2023-  Normal sinus rhythm with frequent sinus tachycardia and sinus bradycardia     Holter monitor [facility] 6/2022  Sinus rhythm with frequent episodes of sinus bradycardia/tachycardia.   Max  bpm. Min HR 55 bpm. Average HR 76 bpm.   Very rare SVE was present as singles.   Rare VE was present as singles.   Patient event marker was activated correlated with sinus rhythm. Patient noted   symptoms of \"palpitations, vibrations and  flutter\".     Scanned on 6/28/2022 Alivia LINDSAY     XR chest 1 view   Final Result   1.  No evidence of acute cardiopulmonary process.                  MACRO:   None        Signed by: Claudy Manning 6/1/2024 9:16 PM   Dictation workstation:   [id number]            Past Medical History:  She has no past medical history on " file.    Past Surgical History:  She has no past surgical history on file.      Social History:  She reports that she has never smoked. She has never been exposed to tobacco smoke. She has never used smokeless tobacco. No history on file for alcohol use and drug use.    Family History:  No family history on file.     Allergies:  Cherry, Shellfish derived, Apple, Bee venom protein (honey bee), Codeine, Latex, Lipitor [atorvastatin], Lisinopril, Macrolide antibiotics, Penicillins, Statins-hmg-coa reductase inhibitors, Strawberry, Zetia [ezetimibe], and Mold    Inpatient Medications:  Scheduled medications   Medication Dose Route Frequency    amLODIPine  5 mg oral Daily    cefTRIAXone  1 g intravenous q24h    enoxaparin  40 mg subcutaneous q24h    insulin lispro  0-10 Units subcutaneous TID    latanoprost  1 drop Both Eyes Nightly    losartan  100 mg oral Daily    pantoprazole  40 mg oral Daily before breakfast    Or    pantoprazole  40 mg intravenous Daily before breakfast    rosuvastatin  5 mg oral Once per day on Monday Wednesday Friday    timolol  1 drop Both Eyes Daily     PRN medications   Medication    acetaminophen    Or    acetaminophen    Or    acetaminophen    dextrose    dextrose    glucagon    glucagon    ondansetron    Or    ondansetron     Continuous Medications   Medication Dose Last Rate     Outpatient Medications:  Current Outpatient Medications   Medication Instructions    amLODIPine (NORVASC) 5 mg, oral, Daily    cetirizine (ZYRTEC) 10 mg, oral, Daily    cyanocobalamin (VITAMIN B-12) 250 mcg, oral, Daily    latanoprost (Xalatan) 0.005 % ophthalmic solution 1 drop, Both Eyes, Nightly    losartan (COZAAR) 100 mg, oral, Daily    metFORMIN (GLUCOPHAGE) 1,000 mg, oral, Daily with breakfast    multivitamin tablet 1 tablet, oral, Daily    rosuvastatin (CRESTOR) 5 mg, oral, 3 times weekly    timolol (Timoptic) 0.5 % ophthalmic solution 1 drop, Both Eyes, Daily       Physical Exam:  GENERAL: alert,  cooperative, pleasant, in no acute distress  SKIN: warm and dry  NECK: Normal JVD, negative HJR  CARDIAC: Regular rate and rhythm with no rubs, murmurs, or gallops  CHEST: Normal respiratory efforts, lungs clear to auscultation bilaterally.  ABDOMEN: soft, nontender, nondistended  EXTREMITIES: no edema, +2 palpable RP bilaterally    Tele reviewed- Normal sinus rhythm HR 60-70s, occ. PVC, Occasional ST        Assessment/Plan   78 year old female with significant past medical history of Hypertension, Hyperlipidemia statin related myalgias, Type II diabetes. Presented to [facility] for evaluation of palpitations.     Elevated troponin- Flat trend, low lying 19/18/16. Recommend echocardiogram.   Palpitations- Felt heart rate pounding out of chest for 5 minutes and right leg shaking. History of chronic palpitations. Last monitor one year ago at [facility] that showed sinus tachycardia at times otherwise no significant arrhythmia. She is being treated for a UTI which may contribute to symptoms. Order holter monitor.  3. Hypertension- Variable. Overall acceptable.   4. Hyperlipidemia- Recent LDL elevated. Defer to primary cardiologist      Peripheral IV 06/02/24 20 G Left Hand (Active)   Site Assessment Clean;Dry;Intact 06/03/24 0905   Dressing Status Clean;Dry 06/03/24 0905   Number of days: 1       Code Status:  Full Code            CHINYERE Gongora-CNP

## 2024-06-03 NOTE — DISCHARGE SUMMARY
Discharge Diagnosis  Palpitations    Issues Requiring Follow-Up  Palpitations    Discharge Meds     Your medication list        CONTINUE taking these medications        Instructions Last Dose Given Next Dose Due   amLODIPine 5 mg tablet  Commonly known as: Norvasc           cetirizine 10 mg tablet  Commonly known as: ZyrTEC           cyanocobalamin 250 mcg tablet  Commonly known as: Vitamin B-12           latanoprost 0.005 % ophthalmic solution  Commonly known as: Xalatan           losartan 100 mg tablet  Commonly known as: Cozaar           metFORMIN 500 mg tablet  Commonly known as: Glucophage           multivitamin tablet           rosuvastatin 5 mg tablet  Commonly known as: Crestor           timolol 0.5 % ophthalmic solution  Commonly known as: Timoptic                    Test Results Pending At Discharge  Pending Labs       Order Current Status    Extra Urine Gray Tube Collected (06/01/24 2010)    Stool Pathogen Panel, PCR In process    Urinalysis with Reflex Culture and Microscopic In process            Hospital Course  Liv Hoskins is a 78 y.o. female, with a PMH of palpitations, HTN, DM, glaucoma, OA, HLD, statin-related myalgias, who presented to [facility] ED on 6/1/2024 after an episode of shaking/tremors and heart racing.   Patient states she had symptoms of sinus headache/right ear pain/feeling rundown in the middle of May, took Mucinex and Flonase on PCP advise-symptoms improved.  She also reports a 2-week history of loose stools (diarrhea twice daily) as well as recent cloudy urine (did not test for UTI as outpatient).  Yesterday she had a sudden onset of heart racing, SBP elevation to 169 which is unusual for her, chills, leg shaking/tremors -EMS summoned.  She denies chest pain/pressure, SOB, syncope or lightheadedness, n/v, loss of bowel or bladder, sick contacts, travel.      In the ED, VSS with BP elevated to 172/105. EKG NSR wo ischemic changes. Labs notable for mild leukocytosis, magnesium 1.5  but otherwise baseline renal fxn and normal electrolytes, troponin 10--19--18, normal BNP and TSH, slight drop in H/H. UA +leukocytes, CXR wo acute cardiopulmonary changes. Patient was given IV abx and admitted for further evaluation.     Patient reports feeling better today, may have had an episode of palpitations last night but not since then, no further diarrhea since admission.  She has history of palpitations, has been evaluated with a Holter as outpatient x 2, without significant findings.  She feels her palpitations are stress related.  Prior holter report per epic:   Holter monitor (6/16/2022):  Sinus rhythm with frequent episodes of sinus bradycardia/tachycardia.  Max  bpm. Min HR 55 bpm. Average HR 76 bpm.     While in OBS, pt was seen by cardiology who determined the palpitations were likely due to a benign tachy-arrhythmia in the setting of electrolyte abnormalities. Patient cleared for discharge from cardiology perspective with TTE, two week event monitor, and OP cardiology FU.     Patient was also being treated for acute cystitis, but culture resulted negative and patient was not having urinary symptoms so abx were stopped.     Patient to follow up with PCP OP.    Labs/Testing reviewed    Interdisciplinary team rounding completed with hospitalist, nurse, TCC    PA discussed plan and lab/testing results with Dr. Arevalo prior to discharge.    Pertinent Physical Exam At Time of Discharge  Physical Exam  Constitutional:       Appearance: Normal appearance.   HENT:      Head: Normocephalic and atraumatic.      Nose: Nose normal.   Cardiovascular:      Rate and Rhythm: Normal rate and regular rhythm.   Pulmonary:      Effort: Pulmonary effort is normal. No respiratory distress.      Breath sounds: Normal breath sounds. No wheezing or rales.   Abdominal:      General: Bowel sounds are normal. There is no distension.      Palpations: Abdomen is soft.      Tenderness: There is no abdominal tenderness.  There is no guarding.   Musculoskeletal:         General: Normal range of motion.      Cervical back: Normal range of motion.      Right lower leg: No edema.      Left lower leg: No edema.   Skin:     General: Skin is warm and dry.   Neurological:      General: No focal deficit present.      Mental Status: She is alert.   Psychiatric:         Mood and Affect: Mood normal.         Behavior: Behavior normal.   Outpatient Follow-Up  No future appointments.      Nisha Menjivar PA-C

## 2024-06-04 LAB

## 2024-06-09 LAB
ATRIAL RATE: 88 BPM
P AXIS: 61 DEGREES
P OFFSET: 173 MS
P ONSET: 121 MS
PR INTERVAL: 200 MS
Q ONSET: 221 MS
QRS COUNT: 14 BEATS
QRS DURATION: 74 MS
QT INTERVAL: 382 MS
QTC CALCULATION(BAZETT): 462 MS
QTC FREDERICIA: 434 MS
R AXIS: 54 DEGREES
T AXIS: 51 DEGREES
T OFFSET: 412 MS
VENTRICULAR RATE: 88 BPM

## 2024-07-04 ENCOUNTER — HOSPITAL ENCOUNTER (OUTPATIENT)
Facility: HOSPITAL | Age: 79
Setting detail: OBSERVATION
Discharge: HOME | End: 2024-07-05
Attending: EMERGENCY MEDICINE | Admitting: INTERNAL MEDICINE
Payer: MEDICARE

## 2024-07-04 ENCOUNTER — APPOINTMENT (OUTPATIENT)
Dept: RADIOLOGY | Facility: HOSPITAL | Age: 79
End: 2024-07-04
Payer: MEDICARE

## 2024-07-04 DIAGNOSIS — R19.7 DIARRHEA, UNSPECIFIED TYPE: ICD-10-CM

## 2024-07-04 DIAGNOSIS — R10.30 LOWER ABDOMINAL PAIN: Primary | ICD-10-CM

## 2024-07-04 DIAGNOSIS — R79.89 ELEVATED LACTIC ACID LEVEL: ICD-10-CM

## 2024-07-04 DIAGNOSIS — R10.9 ABDOMINAL PAIN, UNSPECIFIED ABDOMINAL LOCATION: ICD-10-CM

## 2024-07-04 LAB
ALBUMIN SERPL BCP-MCNC: 3.9 G/DL (ref 3.4–5)
ALP SERPL-CCNC: 63 U/L (ref 33–136)
ALT SERPL W P-5'-P-CCNC: 10 U/L (ref 7–45)
ANION GAP BLDV CALCULATED.4IONS-SCNC: 13 MMOL/L (ref 10–25)
ANION GAP SERPL CALC-SCNC: 13 MMOL/L (ref 10–20)
APPEARANCE UR: CLEAR
AST SERPL W P-5'-P-CCNC: 17 U/L (ref 9–39)
BASE EXCESS BLDV CALC-SCNC: 1.4 MMOL/L (ref -2–3)
BASOPHILS # BLD AUTO: 0.06 X10*3/UL (ref 0–0.1)
BASOPHILS NFR BLD AUTO: 0.9 %
BILIRUB SERPL-MCNC: 1.3 MG/DL (ref 0–1.2)
BILIRUB UR STRIP.AUTO-MCNC: NEGATIVE MG/DL
BODY TEMPERATURE: 37 DEGREES CELSIUS
BUN SERPL-MCNC: 12 MG/DL (ref 6–23)
CA-I BLDV-SCNC: 1.15 MMOL/L (ref 1.1–1.33)
CALCIUM SERPL-MCNC: 9.4 MG/DL (ref 8.6–10.3)
CHLORIDE BLDV-SCNC: 98 MMOL/L (ref 98–107)
CHLORIDE SERPL-SCNC: 95 MMOL/L (ref 98–107)
CO2 SERPL-SCNC: 26 MMOL/L (ref 21–32)
COLOR UR: COLORLESS
CREAT SERPL-MCNC: 0.99 MG/DL (ref 0.5–1.05)
EGFRCR SERPLBLD CKD-EPI 2021: 58 ML/MIN/1.73M*2
EOSINOPHIL # BLD AUTO: 0.11 X10*3/UL (ref 0–0.4)
EOSINOPHIL NFR BLD AUTO: 1.6 %
ERYTHROCYTE [DISTWIDTH] IN BLOOD BY AUTOMATED COUNT: 12.9 % (ref 11.5–14.5)
GLUCOSE BLD MANUAL STRIP-MCNC: 315 MG/DL (ref 74–99)
GLUCOSE BLDV-MCNC: 307 MG/DL (ref 74–99)
GLUCOSE SERPL-MCNC: 355 MG/DL (ref 74–99)
GLUCOSE UR STRIP.AUTO-MCNC: ABNORMAL MG/DL
HCO3 BLDV-SCNC: 27.2 MMOL/L (ref 22–26)
HCT VFR BLD AUTO: 41.8 % (ref 36–46)
HCT VFR BLD EST: 44 % (ref 36–46)
HGB BLD-MCNC: 13.5 G/DL (ref 12–16)
HGB BLDV-MCNC: 14.7 G/DL (ref 12–16)
HOLD SPECIMEN: NORMAL
IMM GRANULOCYTES # BLD AUTO: 0.02 X10*3/UL (ref 0–0.5)
IMM GRANULOCYTES NFR BLD AUTO: 0.3 % (ref 0–0.9)
INHALED O2 CONCENTRATION: 21 %
KETONES UR STRIP.AUTO-MCNC: NEGATIVE MG/DL
LACTATE BLDV-SCNC: 4.1 MMOL/L (ref 0.4–2)
LACTATE SERPL-SCNC: 2.7 MMOL/L (ref 0.4–2)
LACTATE SERPL-SCNC: 2.8 MMOL/L (ref 0.4–2)
LEUKOCYTE ESTERASE UR QL STRIP.AUTO: NEGATIVE
LIPASE SERPL-CCNC: 26 U/L (ref 9–82)
LYMPHOCYTES # BLD AUTO: 1.58 X10*3/UL (ref 0.8–3)
LYMPHOCYTES NFR BLD AUTO: 22.8 %
MCH RBC QN AUTO: 30.7 PG (ref 26–34)
MCHC RBC AUTO-ENTMCNC: 32.3 G/DL (ref 32–36)
MCV RBC AUTO: 95 FL (ref 80–100)
MONOCYTES # BLD AUTO: 0.3 X10*3/UL (ref 0.05–0.8)
MONOCYTES NFR BLD AUTO: 4.3 %
NEUTROPHILS # BLD AUTO: 4.87 X10*3/UL (ref 1.6–5.5)
NEUTROPHILS NFR BLD AUTO: 70.1 %
NITRITE UR QL STRIP.AUTO: NEGATIVE
NRBC BLD-RTO: 0 /100 WBCS (ref 0–0)
OXYHGB MFR BLDV: 40.9 % (ref 45–75)
PCO2 BLDV: 46 MM HG (ref 41–51)
PH BLDV: 7.38 PH (ref 7.33–7.43)
PH UR STRIP.AUTO: 6 [PH]
PLATELET # BLD AUTO: 270 X10*3/UL (ref 150–450)
PO2 BLDV: 30 MM HG (ref 35–45)
POTASSIUM BLDV-SCNC: 4 MMOL/L (ref 3.5–5.3)
POTASSIUM SERPL-SCNC: 3.6 MMOL/L (ref 3.5–5.3)
PROT SERPL-MCNC: 7.6 G/DL (ref 6.4–8.2)
PROT UR STRIP.AUTO-MCNC: NEGATIVE MG/DL
RBC # BLD AUTO: 4.4 X10*6/UL (ref 4–5.2)
RBC # UR STRIP.AUTO: NEGATIVE /UL
SAO2 % BLDV: 42 % (ref 45–75)
SODIUM BLDV-SCNC: 134 MMOL/L (ref 136–145)
SODIUM SERPL-SCNC: 130 MMOL/L (ref 136–145)
SP GR UR STRIP.AUTO: 1
UROBILINOGEN UR STRIP.AUTO-MCNC: NORMAL MG/DL
WBC # BLD AUTO: 6.9 X10*3/UL (ref 4.4–11.3)

## 2024-07-04 PROCEDURE — 96375 TX/PRO/DX INJ NEW DRUG ADDON: CPT | Performed by: INTERNAL MEDICINE

## 2024-07-04 PROCEDURE — 81003 URINALYSIS AUTO W/O SCOPE: CPT | Performed by: EMERGENCY MEDICINE

## 2024-07-04 PROCEDURE — 83690 ASSAY OF LIPASE: CPT | Performed by: EMERGENCY MEDICINE

## 2024-07-04 PROCEDURE — 93010 ELECTROCARDIOGRAM REPORT: CPT | Performed by: STUDENT IN AN ORGANIZED HEALTH CARE EDUCATION/TRAINING PROGRAM

## 2024-07-04 PROCEDURE — 99285 EMERGENCY DEPT VISIT HI MDM: CPT | Mod: 25

## 2024-07-04 PROCEDURE — 85025 COMPLETE CBC W/AUTO DIFF WBC: CPT | Performed by: EMERGENCY MEDICINE

## 2024-07-04 PROCEDURE — 36415 COLL VENOUS BLD VENIPUNCTURE: CPT | Performed by: EMERGENCY MEDICINE

## 2024-07-04 PROCEDURE — 74177 CT ABD & PELVIS W/CONTRAST: CPT

## 2024-07-04 PROCEDURE — 80053 COMPREHEN METABOLIC PANEL: CPT | Performed by: EMERGENCY MEDICINE

## 2024-07-04 PROCEDURE — 96361 HYDRATE IV INFUSION ADD-ON: CPT | Performed by: INTERNAL MEDICINE

## 2024-07-04 PROCEDURE — 2550000001 HC RX 255 CONTRASTS: Performed by: EMERGENCY MEDICINE

## 2024-07-04 PROCEDURE — 82947 ASSAY GLUCOSE BLOOD QUANT: CPT

## 2024-07-04 PROCEDURE — 96374 THER/PROPH/DIAG INJ IV PUSH: CPT | Mod: 59 | Performed by: INTERNAL MEDICINE

## 2024-07-04 PROCEDURE — 84132 ASSAY OF SERUM POTASSIUM: CPT | Performed by: EMERGENCY MEDICINE

## 2024-07-04 PROCEDURE — 83605 ASSAY OF LACTIC ACID: CPT | Mod: 91,MUEWO | Performed by: EMERGENCY MEDICINE

## 2024-07-04 PROCEDURE — 2500000004 HC RX 250 GENERAL PHARMACY W/ HCPCS (ALT 636 FOR OP/ED): Performed by: EMERGENCY MEDICINE

## 2024-07-04 PROCEDURE — 2500000001 HC RX 250 WO HCPCS SELF ADMINISTERED DRUGS (ALT 637 FOR MEDICARE OP): Performed by: EMERGENCY MEDICINE

## 2024-07-04 RX ORDER — ONDANSETRON HYDROCHLORIDE 2 MG/ML
4 INJECTION, SOLUTION INTRAVENOUS ONCE
Status: COMPLETED | OUTPATIENT
Start: 2024-07-04 | End: 2024-07-04

## 2024-07-04 RX ORDER — DICYCLOMINE HYDROCHLORIDE 10 MG/1
10 CAPSULE ORAL ONCE
Status: COMPLETED | OUTPATIENT
Start: 2024-07-04 | End: 2024-07-04

## 2024-07-04 RX ORDER — FAMOTIDINE 10 MG/ML
20 INJECTION INTRAVENOUS ONCE
Status: COMPLETED | OUTPATIENT
Start: 2024-07-04 | End: 2024-07-04

## 2024-07-04 RX ORDER — HYDROMORPHONE HYDROCHLORIDE 0.2 MG/ML
0.2 INJECTION INTRAMUSCULAR; INTRAVENOUS; SUBCUTANEOUS ONCE
Status: COMPLETED | OUTPATIENT
Start: 2024-07-04 | End: 2024-07-04

## 2024-07-04 ASSESSMENT — PAIN SCALES - GENERAL: PAINLEVEL_OUTOF10: 6

## 2024-07-04 ASSESSMENT — COLUMBIA-SUICIDE SEVERITY RATING SCALE - C-SSRS
2. HAVE YOU ACTUALLY HAD ANY THOUGHTS OF KILLING YOURSELF?: NO
1. IN THE PAST MONTH, HAVE YOU WISHED YOU WERE DEAD OR WISHED YOU COULD GO TO SLEEP AND NOT WAKE UP?: NO
6. HAVE YOU EVER DONE ANYTHING, STARTED TO DO ANYTHING, OR PREPARED TO DO ANYTHING TO END YOUR LIFE?: NO

## 2024-07-04 ASSESSMENT — PAIN - FUNCTIONAL ASSESSMENT: PAIN_FUNCTIONAL_ASSESSMENT: 0-10

## 2024-07-04 ASSESSMENT — PAIN DESCRIPTION - LOCATION: LOCATION: ABDOMEN

## 2024-07-04 NOTE — ED TRIAGE NOTES
Pt states that she belly pain, and diarrhea since last night. Pt states that she has been burping a white fluid that has been recurring since before her colonoscopy the 24th.

## 2024-07-04 NOTE — ED PROVIDER NOTES
History/Exam limitations: none.   Additional history was obtained from patient and relative(s).          HPI:    Liv Hoskins is a 79 y.o. female PMH hypertension, diabetes, glaucoma, palpitations, hyperlipidemia, statin related myalgias, recent colonoscopy 10 days ago presenting for evaluation of loose stools and abdominal pain.  Patient states that since her colonoscopy she has had loose stools anytime she eats something.  States that the day after her colonoscopy she had some blood in her stool and loose stools.  States that the bloody stool resolved the following day.  States that they took a biopsy and no polyps were need to be resected.  No upper endoscopy.  States that she has chronic reflux and spits up white-colored mucus frequently which is unchanged.  No cough or chest pain.  No nausea or vomiting.  Has had lower abdominal discomfort, frequent diarrhea anytime she eats something since onset around 10 days ago.  States that she feels very fatigued today generalized weakness, no focal deficits.  No headache or vision change.  Received 1 dose of antibiotics approximate month ago no other antibiotic use.           Physical Exam:  ED Triage Vitals [07/04/24 1646]   Temperature Heart Rate Respirations BP   36.8 °C (98.3 °F) 91 18 160/77      Pulse Ox Temp Source Heart Rate Source Patient Position   100 % Tympanic Monitor Sitting      BP Location FiO2 (%)     Right arm --        GEN:      Alert, NAD  Eyes:       PERRL, EOMI  HENT:      NC/AT, OP clear, airway patent, MM  CV:      RRR, no MRG, no LE pitting edema, 2+ radial and pedal pulses  PULM:     CTAB, no w/r/r, easy WOB, symmetric chest rise  ABD:      Soft, mild diffuse abdominal tenderness, lower greater than upper, no rebound or guarding, negative Ignacio, ND, no masses, BS +  :       No CVA TTP  NEURO:   A&Ox3, no focal deficits    MSK:      FROM, no joint deformities or swelling, no e/o trauma  SKIN:       Warm and dry  PSYCH:    Appropriate mood  and affect         MDM/ED Course:   Liv Hoskins is a 79 y.o. female PMH hypertension, diabetes, glaucoma, palpitations, hyperlipidemia, statin related myalgias, recent colonoscopy 10 days ago presenting for evaluation of loose stools and abdominal pain.  Vitals reassuring.  Exam as documented above.  Differential for abdominal pain includes but is not limited to SBO, incarcerated hernia, pancreatitis, viscous organ rupture, mesenteric ischemia, aortic pathology, biliary pathology, diverticulitis, appendicitis, UTI/pyelonephritis, nephrolithiasis, viral illness.  Differential close infectious colitis, C. difficile.  Has scant bloody stools after colonoscopy, biopsy had been performed this has resolved which is reassuring.  IV placed and labs drawn.  CBC reassuring with no leukocytosis or significant anemia.  Chemistry with elevated glucose at 355, sodium 130 in setting of elevated glucose.  Renal function reassuring.  Liver enzymes overall reassuring.  Lipase negative.  VBG was obtained given elevated glucose and normal pH unlikely DKA.  No anion gap, normal bicarb.  Lactate was 4.1 on venous blood gas, unclear how accurate this is.  Urinalysis reviewed and reassuring with no acute findings.  After 1 L IV fluids a venous lactate was sent to the lab and elevated at 2.7.  Patient received IV Pepcid, IV fluids, IV Zofran.  Patient did have continued symptoms and was given IV Dilaudid 0.2 mg and Bentyl p.o.  Additional liter of IV fluids was given and repeat lactate obtained and 2.8.  Patient reports she continues to feel unwell and does not feel comfortable going home.  Discussed patient's reassuring workup overall however patient feels too unwell to function at home on final reassessment.  Considered mesenteric ischemia given elevated lactate however suspect is related to dehydration in setting of frequent diarrhea, patient has some but minimal abdominal pain and no distress.  Patient care signed out to admitting  team for continued management stable condition.     ED Course as of 07/07/24 0946   Thu Jul 04, 2024 2059 Lactate 4.1 on VBG, normal pH, no anion gap or bicarb abnormality.  Patient given IV fluids, lactate 2.7 on repeat.  Patient has some abdominal pain, minimal, developed some nausea as well.  IV Zofran and further IV fluids ordered.  No bowel wall edema on CT imaging, no evidence obvious vascular abnormality, consider mesenteric ischemia but overall seems less likely given patient is very well-appearing, downtrending lactic acid, reassuring imaging with no bowel wall edema.  Suspect elevated lactate related to frequent diarrhea. [JM]      ED Course User Index  [JM] Carlos Betts MD         Chronic medical conditions affecting care listed in MDM. I independently reviewed imaging studies and agreed with radiology reads. I reviewed recent and relevant outside records including PCP notes, Prior discharge summaries, and prior radiology reports.    Procedure  Procedures    Diagnosis:   1.  Abdominal pain  2.  Fatigue  3.  Diarrhea  4.  Elevated lactic acid    Dispo: Hospitalized in stable condition      Disclaimer: Portions of this note were dictated by speech recognition. An attempt at proof reading was made to minimize errors. Minor errors in transcription may be present.  Please call if questions.     Carlos Betts MD  07/07/24 0946

## 2024-07-05 VITALS
WEIGHT: 166 LBS | TEMPERATURE: 98.3 F | HEIGHT: 64 IN | OXYGEN SATURATION: 97 % | BODY MASS INDEX: 28.34 KG/M2 | DIASTOLIC BLOOD PRESSURE: 64 MMHG | SYSTOLIC BLOOD PRESSURE: 132 MMHG | HEART RATE: 86 BPM | RESPIRATION RATE: 16 BRPM

## 2024-07-05 PROBLEM — E78.5 HLD (HYPERLIPIDEMIA): Status: ACTIVE | Noted: 2024-07-05

## 2024-07-05 PROBLEM — I10 HYPERTENSION: Status: ACTIVE | Noted: 2024-07-05

## 2024-07-05 PROBLEM — K52.9 CHRONIC DIARRHEA: Status: ACTIVE | Noted: 2024-07-05

## 2024-07-05 PROBLEM — E11.9 DIABETES MELLITUS (MULTI): Status: ACTIVE | Noted: 2024-07-05

## 2024-07-05 PROBLEM — M89.9 LESION OF LEFT FEMUR: Status: ACTIVE | Noted: 2024-07-05

## 2024-07-05 LAB
ANION GAP SERPL CALC-SCNC: 12 MMOL/L (ref 10–20)
BUN SERPL-MCNC: 9 MG/DL (ref 6–23)
CALCIUM SERPL-MCNC: 9.6 MG/DL (ref 8.6–10.3)
CHLORIDE SERPL-SCNC: 103 MMOL/L (ref 98–107)
CO2 SERPL-SCNC: 28 MMOL/L (ref 21–32)
CREAT SERPL-MCNC: 0.94 MG/DL (ref 0.5–1.05)
EGFRCR SERPLBLD CKD-EPI 2021: 61 ML/MIN/1.73M*2
GLUCOSE BLD MANUAL STRIP-MCNC: 105 MG/DL (ref 74–99)
GLUCOSE SERPL-MCNC: 90 MG/DL (ref 74–99)
LACTATE SERPL-SCNC: 1.2 MMOL/L (ref 0.4–2)
POTASSIUM SERPL-SCNC: 3.8 MMOL/L (ref 3.5–5.3)
SODIUM SERPL-SCNC: 139 MMOL/L (ref 136–145)
TSH SERPL-ACNC: 1.58 MIU/L (ref 0.44–3.98)

## 2024-07-05 PROCEDURE — G0378 HOSPITAL OBSERVATION PER HR: HCPCS

## 2024-07-05 PROCEDURE — 36415 COLL VENOUS BLD VENIPUNCTURE: CPT | Performed by: INTERNAL MEDICINE

## 2024-07-05 PROCEDURE — 84443 ASSAY THYROID STIM HORMONE: CPT | Performed by: INTERNAL MEDICINE

## 2024-07-05 PROCEDURE — 83605 ASSAY OF LACTIC ACID: CPT | Performed by: INTERNAL MEDICINE

## 2024-07-05 PROCEDURE — 80048 BASIC METABOLIC PNL TOTAL CA: CPT | Performed by: INTERNAL MEDICINE

## 2024-07-05 PROCEDURE — 82947 ASSAY GLUCOSE BLOOD QUANT: CPT

## 2024-07-05 PROCEDURE — 2500000001 HC RX 250 WO HCPCS SELF ADMINISTERED DRUGS (ALT 637 FOR MEDICARE OP): Performed by: INTERNAL MEDICINE

## 2024-07-05 RX ORDER — METFORMIN HYDROCHLORIDE 500 MG/1
500 TABLET ORAL
Qty: 60 TABLET | Refills: 0 | Status: SHIPPED | OUTPATIENT
Start: 2024-07-05 | End: 2024-08-04

## 2024-07-05 RX ORDER — INSULIN LISPRO 100 [IU]/ML
0-10 INJECTION, SOLUTION INTRAVENOUS; SUBCUTANEOUS
Status: DISCONTINUED | OUTPATIENT
Start: 2024-07-05 | End: 2024-07-05 | Stop reason: HOSPADM

## 2024-07-05 RX ORDER — DEXTROSE 50 % IN WATER (D50W) INTRAVENOUS SYRINGE
25
Status: DISCONTINUED | OUTPATIENT
Start: 2024-07-05 | End: 2024-07-05 | Stop reason: HOSPADM

## 2024-07-05 RX ORDER — PANTOPRAZOLE SODIUM 40 MG/1
40 TABLET, DELAYED RELEASE ORAL
Qty: 30 TABLET | Refills: 0 | Status: SHIPPED | OUTPATIENT
Start: 2024-07-05 | End: 2024-08-04

## 2024-07-05 RX ORDER — LOPERAMIDE HYDROCHLORIDE 2 MG/1
2 CAPSULE ORAL ONCE
Status: COMPLETED | OUTPATIENT
Start: 2024-07-05 | End: 2024-07-05

## 2024-07-05 RX ORDER — DEXTROSE 50 % IN WATER (D50W) INTRAVENOUS SYRINGE
12.5
Status: DISCONTINUED | OUTPATIENT
Start: 2024-07-05 | End: 2024-07-05 | Stop reason: HOSPADM

## 2024-07-05 RX ORDER — CHOLESTYRAMINE 4 G/9G
2 POWDER, FOR SUSPENSION ORAL DAILY
Qty: 7 PACKET | Refills: 0 | Status: SHIPPED | OUTPATIENT
Start: 2024-07-05 | End: 2024-07-19

## 2024-07-05 ASSESSMENT — PAIN SCALES - GENERAL
PAINLEVEL_OUTOF10: 0 - NO PAIN
PAINLEVEL_OUTOF10: 3

## 2024-07-05 ASSESSMENT — ENCOUNTER SYMPTOMS
MUSCULOSKELETAL NEGATIVE: 1
CARDIOVASCULAR NEGATIVE: 1
NEUROLOGICAL NEGATIVE: 1
RESPIRATORY NEGATIVE: 1
PSYCHIATRIC NEGATIVE: 1
DIARRHEA: 1
CONSTITUTIONAL NEGATIVE: 1

## 2024-07-05 ASSESSMENT — PAIN - FUNCTIONAL ASSESSMENT
PAIN_FUNCTIONAL_ASSESSMENT: 0-10
PAIN_FUNCTIONAL_ASSESSMENT: 0-10

## 2024-07-05 ASSESSMENT — ACTIVITIES OF DAILY LIVING (ADL): LACK_OF_TRANSPORTATION: NO

## 2024-07-05 ASSESSMENT — PAIN DESCRIPTION - LOCATION: LOCATION: ABDOMEN

## 2024-07-05 NOTE — PROGRESS NOTES
07/05/24 0721   [facility] Dis[facility] Status   Are you deaf or do you have serious difficulty hearing? N   Are you blind or do you have serious difficulty seeing, even when wearing glasses? N   Because of a physical, mental, or emotional condition, do you have serious difficulty concentrating, remembering, or making decisions? (5 years old or older) N   Do you have serious difficulty walking or climbing stairs? N   Do you have serious difficulty dressing or bathing? N   Because of a physical, mental, or emotional condition, do you have serious difficulty doing errands alone such as visiting the doctor? N

## 2024-07-05 NOTE — PROGRESS NOTES
Pharmacy Medication History Review   Spoke to the patient. Pt takes Crestor 3 times a week, no specific days.  Metformin was increased a couple weeks ago, but was decreased back, 500 mg Bid    Liv Hoskins is a 79 y.o. female admitted for Abdominal pain. Pharmacy reviewed the patient's vxxwj-us-jdenwplzv medications and allergies for accuracy.    The list below reflectives the updated PTA list. Please review each medication in order reconciliation for additional clarification and justification.  Prior to Admission Medications   Prescriptions Last Dose Informant   amLODIPine (Norvasc) 5 mg tablet     Sig: Take 1 tablet (5 mg) by mouth once daily.   cetirizine (ZyrTEC) 10 mg tablet 7/4/2024    Sig: Take 1 tablet (10 mg) by mouth once daily.   cyanocobalamin (Vitamin B-12) 250 mcg tablet     Sig: Take 1 tablet (250 mcg) by mouth once daily.   latanoprost (Xalatan) 0.005 % ophthalmic solution     Sig: Administer 1 drop into both eyes once daily at bedtime.   losartan (Cozaar) 100 mg tablet 7/4/2024    Sig: Take 1 tablet (100 mg) by mouth once daily.           metFORMIN (Glucophage) 500 mg tablet 7/4/2024    Sig: Take 1 tablet (500 mg) by mouth 2 times daily (morning and late afternoon).   multivitamin tablet     Sig: Take 1 tablet by mouth once daily.   rosuvastatin (Crestor) 5 mg tablet     Sig: Take 1 tablet (5 mg) by mouth 3 (three) times a week.   timolol (Timoptic) 0.5 % ophthalmic solution     Sig: Administer 1 drop into both eyes once daily.      Facility-Administered Medications: None       The list below reflectives the updated allergy list. Please review each documented allergy for additional clarification and justification.  Allergies  Reviewed by Amber Pennington, EMT on 7/4/2024        Severity Reactions Comments    Cherry High Anaphylaxis     Shellfish Derived High Anaphylaxis     Apple Not Specified Hives     Bee Venom Protein (honey Bee) Not Specified Swelling     Codeine Not Specified Hallucinations      Latex Not Specified Hives, Swelling     Lipitor [atorvastatin] Not Specified Myalgia     Lisinopril Not Specified Angioedema     Macrolide Antibiotics Not Specified Swelling     Penicillins Not Specified Swelling     Statins-hmg-coa Reductase Inhibitors Not Specified Myalgia     Strawberry Not Specified Hives     Zetia [ezetimibe] Not Specified Headache     Mold Low Rash             Below are additional concerns with the patient's PTA list.      Angélica Tuttle

## 2024-07-05 NOTE — DISCHARGE INSTRUCTIONS
PANTOPRAZOLE FOR ACID REFLUX.  TRY IMODIUM 45 MIN BEFORE MEALS FOR DIARRHEA. IF THIS DOESN'T HELP AFTER A WEEK, TRY CHOLESTYRAMINE DAILY.  FOLLOW UP WITH GASTRO DOCTOR FOR THIS (REFERRAL PLACED).  MRI OF LEFT LEG ORDERED.

## 2024-07-05 NOTE — H&P
"History Of Present Illness  Liv Hoskins is a 79 y.o. female presenting with loose stools.  Past Medical History:   Diagnosis Date    Diabetes mellitus (Multi)     HLD (hyperlipidemia)     Hypertension      She has been having loose stools nearly every time she eats; this has been going on for >1 month. She had cscope recently, which was normal with negative biopsy. Diarrhea does not occur overnight/when she doesn't eat. No blood in it. She tried imodium for the first time yesterday. She has not had formal evaluation with GI for this yet. Yesterday, she had episode of \"chills,\" which was unusual for her. She has some chronic RLQ, which is not any worse. She describes what sounds like GERD - reflux and spitting up some mucous, but isnt on anything for that. Metformin dose was increased a week ago. Work-up shows elevated lactate, but normal anion gap. CT abdomen is unremarkable. When I saw her, she was feeling fine; no diarrhea overnight.     No past surgical history on file.  Social History     Tobacco Use    Smoking status: Never     Passive exposure: Never    Smokeless tobacco: Never   Substance Use Topics    Alcohol use: Not Currently    Drug use: Not Currently     No family history on file.  Allergies  Cherry, Shellfish derived, Apple, Bee venom protein (honey bee), Codeine, Latex, Lipitor [atorvastatin], Lisinopril, Macrolide antibiotics, Penicillins, Statins-hmg-coa reductase inhibitors, Strawberry, Zetia [ezetimibe], and Mold    Review of Systems   Constitutional: Negative.    HENT: Negative.     Respiratory: Negative.     Cardiovascular: Negative.    Gastrointestinal:  Positive for diarrhea.   Genitourinary: Negative.    Musculoskeletal: Negative.    Skin: Negative.    Neurological: Negative.    Psychiatric/Behavioral: Negative.         Last Recorded Vitals  Blood pressure 132/64, pulse 86, temperature 36.8 °C (98.3 °F), temperature source Tympanic, resp. rate 16, height 1.626 m (5' 4\"), weight 75.3 kg " (166 lb), SpO2 97%.  Physical Exam  Cardiovascular:      Rate and Rhythm: Normal rate and regular rhythm.      Heart sounds: Normal heart sounds.   Pulmonary:      Breath sounds: Normal breath sounds.   Abdominal:      General: Bowel sounds are normal.      Palpations: Abdomen is soft.   Musculoskeletal:         General: Normal range of motion.   Neurological:      General: No focal deficit present.      Mental Status: She is alert and oriented to person, place, and time.   Psychiatric:         Mood and Affect: Mood normal.           Relevant Results           Assessment/Plan   Principal Problem:    Chronic diarrhea  Active Problems:    Abdominal pain    Hypertension    HLD (hyperlipidemia)    Diabetes mellitus (Multi)    Lesion of left femur  - not clear what is cause of her loose stools; microscopic colitis would have been ruled out with her cscope; doubt any infection; could be IBS-D, since it correlated with eating  - I asked her to try imodium before meals for 1 week; if no improvement, she will try cholestyramine  - will cut metformin back to 500 mg bid till diarrhea is figured out  - elevated lactate likely related to volume depletion  - follow up with GI  - outpatient MRI femur for lucent lesion  - discharge home         I spent 50 minutes in the professional and overall care of this patient.      Jaison Arevalo MD

## 2024-07-05 NOTE — PROGRESS NOTES
Transitional Care Coordination Progress Note:  Plan per Medical/Surgical team: treatment of abd pain & diarrhea with bentyl, pepcid, Dilaudid, IV fluids  Status: Observation  Payor source: [employer]  Discharge disposition: Home alone   Potential Barriers: glucose 355- insulin, lactate 2.8  ADOD: 7/6/2024   ZAIRA Holguin RN, BSN Transitional Care Coordinator ED# [phone]      07/05/24 0721   Discharge Planning   Living Arrangements Alone   Support Systems Family members;Children   Assistance Needed GI work up   Type of Residence Private residence   Number of Stairs to Enter Residence 0   Number of Stairs Within Residence 0   Do you have animals or pets at home? No   Home or Post Acute Services None   Patient expects to be discharged to: Home alone   Does the patient need discharge transport arranged? No   Financial Resource Strain   How hard is it for you to pay for the very basics like food, housing, medical care, and heating? Not hard   Housing Stability   In the last 12 months, was there a time when you were not able to pay the mortgage or rent on time? N   In the last 12 months, how many places have you lived? 1   In the last 12 months, was there a time when you did not have a steady place to sleep or slept in a shelter (including now)? N   Transportation Needs   In the past 12 months, has lack of transportation kept you from medical appointments or from getting medications? no   In the past 12 months, has lack of transportation kept you from meetings, work, or from getting things needed for daily living? No

## 2024-07-05 NOTE — PROGRESS NOTES
Home alone      07/05/24 0721   Current Planned Discharge Disposition   Current Planned Discharge Disposition Home

## 2024-07-06 LAB
ATRIAL RATE: 83 BPM
P AXIS: 64 DEGREES
P OFFSET: 177 MS
P ONSET: 126 MS
PR INTERVAL: 188 MS
Q ONSET: 220 MS
QRS COUNT: 14 BEATS
QRS DURATION: 72 MS
QT INTERVAL: 390 MS
QTC CALCULATION(BAZETT): 458 MS
QTC FREDERICIA: 434 MS
R AXIS: 57 DEGREES
T AXIS: 55 DEGREES
T OFFSET: 415 MS
VENTRICULAR RATE: 83 BPM

## 2024-07-09 ENCOUNTER — APPOINTMENT (OUTPATIENT)
Dept: GASTROENTEROLOGY | Facility: HOSPITAL | Age: 79
End: 2024-07-09
Payer: MEDICARE

## 2024-07-18 LAB — BODY SURFACE AREA: 1.84 M2

## 2024-07-19 ENCOUNTER — HOSPITAL ENCOUNTER (OUTPATIENT)
Dept: RADIOLOGY | Facility: HOSPITAL | Age: 79
Discharge: HOME | End: 2024-07-19
Payer: MEDICARE

## 2024-07-19 DIAGNOSIS — R10.30 LOWER ABDOMINAL PAIN: ICD-10-CM

## 2024-07-19 PROCEDURE — 73720 MRI LWR EXTREMITY W/O&W/DYE: CPT | Mod: LT

## 2024-07-19 PROCEDURE — A9575 INJ GADOTERATE MEGLUMI 0.1ML: HCPCS

## 2024-07-19 PROCEDURE — 2550000001 HC RX 255 CONTRASTS

## 2024-07-19 RX ORDER — GADOTERATE MEGLUMINE 376.9 MG/ML
15 INJECTION INTRAVENOUS
Status: COMPLETED | OUTPATIENT
Start: 2024-07-19 | End: 2024-07-19

## 2024-07-23 ENCOUNTER — APPOINTMENT (OUTPATIENT)
Dept: GASTROENTEROLOGY | Facility: HOSPITAL | Age: 79
End: 2024-07-23
Payer: MEDICARE

## 2024-07-24 ENCOUNTER — APPOINTMENT (OUTPATIENT)
Dept: CARDIOLOGY | Facility: HOSPITAL | Age: 79
End: 2024-07-24
Payer: MEDICARE

## 2024-07-24 ENCOUNTER — APPOINTMENT (OUTPATIENT)
Dept: RADIOLOGY | Facility: HOSPITAL | Age: 79
End: 2024-07-24
Payer: MEDICARE

## 2024-07-24 ENCOUNTER — HOSPITAL ENCOUNTER (EMERGENCY)
Facility: HOSPITAL | Age: 79
Discharge: HOME | End: 2024-07-24
Attending: EMERGENCY MEDICINE
Payer: MEDICARE

## 2024-07-24 VITALS
HEART RATE: 58 BPM | SYSTOLIC BLOOD PRESSURE: 151 MMHG | DIASTOLIC BLOOD PRESSURE: 76 MMHG | OXYGEN SATURATION: 98 % | TEMPERATURE: 98.2 F | RESPIRATION RATE: 18 BRPM

## 2024-07-24 DIAGNOSIS — R10.30 LOWER ABDOMINAL PAIN: ICD-10-CM

## 2024-07-24 DIAGNOSIS — R10.9 CHRONIC ABDOMINAL PAIN: Primary | ICD-10-CM

## 2024-07-24 DIAGNOSIS — G89.29 CHRONIC ABDOMINAL PAIN: Primary | ICD-10-CM

## 2024-07-24 LAB
ALBUMIN SERPL BCP-MCNC: 4.2 G/DL (ref 3.4–5)
ALP SERPL-CCNC: 58 U/L (ref 33–136)
ALT SERPL W P-5'-P-CCNC: 13 U/L (ref 7–45)
ANION GAP SERPL CALC-SCNC: 16 MMOL/L (ref 10–20)
APPEARANCE UR: CLEAR
AST SERPL W P-5'-P-CCNC: 42 U/L (ref 9–39)
BACTERIA #/AREA URNS AUTO: ABNORMAL /HPF
BASOPHILS # BLD AUTO: 0.06 X10*3/UL (ref 0–0.1)
BASOPHILS NFR BLD AUTO: 0.8 %
BILIRUB SERPL-MCNC: 0.9 MG/DL (ref 0–1.2)
BILIRUB UR STRIP.AUTO-MCNC: NEGATIVE MG/DL
BUN SERPL-MCNC: 14 MG/DL (ref 6–23)
CALCIUM SERPL-MCNC: 9.8 MG/DL (ref 8.6–10.3)
CARDIAC TROPONIN I PNL SERPL HS: 5 NG/L (ref 0–13)
CHLORIDE SERPL-SCNC: 98 MMOL/L (ref 98–107)
CO2 SERPL-SCNC: 26 MMOL/L (ref 21–32)
COLOR UR: COLORLESS
CREAT SERPL-MCNC: 1 MG/DL (ref 0.5–1.05)
EGFRCR SERPLBLD CKD-EPI 2021: 57 ML/MIN/1.73M*2
EOSINOPHIL # BLD AUTO: 0.19 X10*3/UL (ref 0–0.4)
EOSINOPHIL NFR BLD AUTO: 2.6 %
ERYTHROCYTE [DISTWIDTH] IN BLOOD BY AUTOMATED COUNT: 12.6 % (ref 11.5–14.5)
GLUCOSE SERPL-MCNC: 216 MG/DL (ref 74–99)
GLUCOSE UR STRIP.AUTO-MCNC: ABNORMAL MG/DL
HCT VFR BLD AUTO: 45.8 % (ref 36–46)
HGB BLD-MCNC: 14.7 G/DL (ref 12–16)
HYALINE CASTS #/AREA URNS AUTO: ABNORMAL /LPF
IMM GRANULOCYTES # BLD AUTO: 0.01 X10*3/UL (ref 0–0.5)
IMM GRANULOCYTES NFR BLD AUTO: 0.1 % (ref 0–0.9)
INR PPP: 1.3 (ref 0.9–1.1)
KETONES UR STRIP.AUTO-MCNC: NEGATIVE MG/DL
LACTATE SERPL-SCNC: 1.8 MMOL/L (ref 0.4–2)
LEUKOCYTE ESTERASE UR QL STRIP.AUTO: ABNORMAL
LIPASE SERPL-CCNC: 39 U/L (ref 9–82)
LYMPHOCYTES # BLD AUTO: 2.05 X10*3/UL (ref 0.8–3)
LYMPHOCYTES NFR BLD AUTO: 27.9 %
MCH RBC QN AUTO: 30.9 PG (ref 26–34)
MCHC RBC AUTO-ENTMCNC: 32.1 G/DL (ref 32–36)
MCV RBC AUTO: 96 FL (ref 80–100)
MONOCYTES # BLD AUTO: 0.35 X10*3/UL (ref 0.05–0.8)
MONOCYTES NFR BLD AUTO: 4.8 %
NEUTROPHILS # BLD AUTO: 4.68 X10*3/UL (ref 1.6–5.5)
NEUTROPHILS NFR BLD AUTO: 63.8 %
NITRITE UR QL STRIP.AUTO: NEGATIVE
NRBC BLD-RTO: 0 /100 WBCS (ref 0–0)
PH UR STRIP.AUTO: 5.5 [PH]
PLATELET # BLD AUTO: 277 X10*3/UL (ref 150–450)
POTASSIUM SERPL-SCNC: 5.2 MMOL/L (ref 3.5–5.3)
PROT SERPL-MCNC: 7.5 G/DL (ref 6.4–8.2)
PROT UR STRIP.AUTO-MCNC: ABNORMAL MG/DL
PROTHROMBIN TIME: 14.1 SECONDS (ref 9.8–12.8)
RBC # BLD AUTO: 4.76 X10*6/UL (ref 4–5.2)
RBC # UR STRIP.AUTO: ABNORMAL /UL
RBC #/AREA URNS AUTO: ABNORMAL /HPF
SODIUM SERPL-SCNC: 135 MMOL/L (ref 136–145)
SP GR UR STRIP.AUTO: 1.01
SQUAMOUS #/AREA URNS AUTO: ABNORMAL /HPF
UROBILINOGEN UR STRIP.AUTO-MCNC: NORMAL MG/DL
WBC # BLD AUTO: 7.3 X10*3/UL (ref 4.4–11.3)
WBC #/AREA URNS AUTO: ABNORMAL /HPF

## 2024-07-24 PROCEDURE — 87086 URINE CULTURE/COLONY COUNT: CPT | Mod: AHULAB | Performed by: EMERGENCY MEDICINE

## 2024-07-24 PROCEDURE — 96361 HYDRATE IV INFUSION ADD-ON: CPT | Performed by: EMERGENCY MEDICINE

## 2024-07-24 PROCEDURE — 74174 CTA ABD&PLVS W/CONTRAST: CPT

## 2024-07-24 PROCEDURE — 85610 PROTHROMBIN TIME: CPT | Performed by: EMERGENCY MEDICINE

## 2024-07-24 PROCEDURE — 83605 ASSAY OF LACTIC ACID: CPT | Performed by: EMERGENCY MEDICINE

## 2024-07-24 PROCEDURE — 74178 CT ABD&PLV WO CNTR FLWD CNTR: CPT | Performed by: RADIOLOGY

## 2024-07-24 PROCEDURE — 2550000001 HC RX 255 CONTRASTS: Performed by: EMERGENCY MEDICINE

## 2024-07-24 PROCEDURE — 85025 COMPLETE CBC W/AUTO DIFF WBC: CPT | Performed by: EMERGENCY MEDICINE

## 2024-07-24 PROCEDURE — 84484 ASSAY OF TROPONIN QUANT: CPT | Performed by: EMERGENCY MEDICINE

## 2024-07-24 PROCEDURE — 2500000001 HC RX 250 WO HCPCS SELF ADMINISTERED DRUGS (ALT 637 FOR MEDICARE OP): Performed by: EMERGENCY MEDICINE

## 2024-07-24 PROCEDURE — 96374 THER/PROPH/DIAG INJ IV PUSH: CPT | Mod: 59 | Performed by: EMERGENCY MEDICINE

## 2024-07-24 PROCEDURE — 36415 COLL VENOUS BLD VENIPUNCTURE: CPT | Performed by: EMERGENCY MEDICINE

## 2024-07-24 PROCEDURE — 2500000004 HC RX 250 GENERAL PHARMACY W/ HCPCS (ALT 636 FOR OP/ED): Performed by: EMERGENCY MEDICINE

## 2024-07-24 PROCEDURE — 83690 ASSAY OF LIPASE: CPT | Performed by: EMERGENCY MEDICINE

## 2024-07-24 PROCEDURE — 93005 ELECTROCARDIOGRAM TRACING: CPT

## 2024-07-24 PROCEDURE — 81001 URINALYSIS AUTO W/SCOPE: CPT | Performed by: EMERGENCY MEDICINE

## 2024-07-24 PROCEDURE — 99285 EMERGENCY DEPT VISIT HI MDM: CPT | Mod: 25 | Performed by: EMERGENCY MEDICINE

## 2024-07-24 PROCEDURE — 80053 COMPREHEN METABOLIC PANEL: CPT | Performed by: EMERGENCY MEDICINE

## 2024-07-24 RX ORDER — LIDOCAINE HYDROCHLORIDE 20 MG/ML
15 SOLUTION OROPHARYNGEAL ONCE
Status: COMPLETED | OUTPATIENT
Start: 2024-07-24 | End: 2024-07-24

## 2024-07-24 RX ORDER — DICYCLOMINE HYDROCHLORIDE 10 MG/1
10 CAPSULE ORAL ONCE
Status: COMPLETED | OUTPATIENT
Start: 2024-07-24 | End: 2024-07-24

## 2024-07-24 RX ORDER — FAMOTIDINE 10 MG/ML
20 INJECTION, SOLUTION INTRAVENOUS ONCE
Status: COMPLETED | OUTPATIENT
Start: 2024-07-24 | End: 2024-07-24

## 2024-07-24 RX ORDER — CHOLESTYRAMINE 4 G/9G
2 POWDER, FOR SUSPENSION ORAL DAILY
Qty: 7 PACKET | Refills: 0 | Status: SHIPPED | OUTPATIENT
Start: 2024-07-24 | End: 2024-09-27 | Stop reason: SDUPTHER

## 2024-07-24 RX ORDER — ALUMINUM HYDROXIDE, MAGNESIUM HYDROXIDE, AND SIMETHICONE 1200; 120; 1200 MG/30ML; MG/30ML; MG/30ML
30 SUSPENSION ORAL ONCE
Status: COMPLETED | OUTPATIENT
Start: 2024-07-24 | End: 2024-07-24

## 2024-07-24 RX ADMIN — SODIUM CHLORIDE, POTASSIUM CHLORIDE, SODIUM LACTATE AND CALCIUM CHLORIDE 1000 ML: 600; 310; 30; 20 INJECTION, SOLUTION INTRAVENOUS at 15:43

## 2024-07-24 RX ADMIN — ALUMINUM HYDROXIDE, MAGNESIUM HYDROXIDE, AND DIMETHICONE 30 ML: 200; 20; 200 SUSPENSION ORAL at 15:44

## 2024-07-24 RX ADMIN — FAMOTIDINE 20 MG: 10 INJECTION, SOLUTION INTRAVENOUS at 15:46

## 2024-07-24 RX ADMIN — DICYCLOMINE HYDROCHLORIDE 10 MG: 10 CAPSULE ORAL at 15:44

## 2024-07-24 RX ADMIN — LIDOCAINE HYDROCHLORIDE 15 ML: 20 SOLUTION ORAL at 15:44

## 2024-07-24 RX ADMIN — IOHEXOL 75 ML: 350 INJECTION, SOLUTION INTRAVENOUS at 16:46

## 2024-07-24 NOTE — DISCHARGE INSTRUCTIONS
You are seen emergency room today for evaluation of abdominal pain.  Your symptoms started after stopping her Colesytramine/running out.  Please restart as previously prescribed.  Please return the emergency room if you have worsening symptoms including but not limited to worsening pain, chest pain, shortness breath, fever, significant fatigue or any other concerns.  Please follow-up with primary care provider and gastroenterology.

## 2024-07-24 NOTE — ED PROVIDER NOTES
History/Exam limitations: none.   Additional history was obtained from patient.          HPI:    Liv Hoskins is a 79 y.o. female PMH hypertension, diabetes, glaucoma, palpitations, hyperlipidemia, statin related myalgias presenting for evaluation of abdominal pain diarrhea.  Patient states she had onset of symptoms yesterday.  States that her pain is diffuse, moderate to severe.  No radiation.  Has having loose stools as well.  Thought she saw some red tinge in 1 stool however believes it was something she ate, no bloody stools more recently.  No headache, vision change, chest pain, shortness of breath.  States that she was taking Colesytramine and stopped taking a few days ago as she ran out.  Believes she may have had some improvement symptoms with this.  Is also taking Protonix and compliant.  Did not yet take her blood pressure medication today.  No dysuria.          Physical Exam:  ED Triage Vitals   Temperature Heart Rate Respirations BP   07/24/24 1418 07/24/24 1418 07/24/24 1418 07/24/24 1415   36.8 °C (98.2 °F) 71 18 136/83      Pulse Ox Temp Source Heart Rate Source Patient Position   07/24/24 1415 07/24/24 1418 -- --   100 % Oral        BP Location FiO2 (%)     -- --              GEN:      Alert, NAD  Eyes:       PERRL, EOMI  HENT:      NC/AT, OP clear, airway patent, MM  CV:      RRR, no MRG, no LE pitting edema, 2+ radial and pedal pulses  PULM:     CTAB, no w/r/r, easy WOB, symmetric chest rise  ABD:      Soft, minimal diffuse tenderness, no rebound or guarding, negative Ignacio, ND, no masses, BS +  :       No CVA TTP  NEURO:   A&Ox3, no focal deficits    MSK:      FROM, no joint deformities or swelling, no e/o trauma  SKIN:       Warm and dry  PSYCH:    Appropriate mood and affect         MDM/ED Course:   Liv Hoskins is a 79 y.o. female PMH hypertension, diabetes, glaucoma, palpitations, hyperlipidemia, statin related myalgias presenting for evaluation of abdominal pain diarrhea.  Vitals  reassuring.  Exam as documented above.  Differential for abdominal pain includes but is not limited to SBO, incarcerated hernia, pancreatitis, viscous organ rupture, mesenteric ischemia, aortic pathology, biliary pathology, diverticulitis, appendicitis, UTI/pyelonephritis, nephrolithiasis, viral illness.  Patient exam is overall reassuring.  Patient did previously have an elevated lactic acid on prior ED visit for similar and not a markedly impressive exam.  Mesenteric ischemia is on the differential.  Patient did have improvement in symptoms on Colesytramine, symptoms did restart shortly after completing 2-week trial potentially rebound of prior symptoms.  IV placed and labs drawn.  Chemistry with mild hyperglycemia reassuring electrolytes and renal function.  Lipase negative unlikely pancreatitis.  Troponin negative unlikely atypical ACS.  Urinalysis with large gastritis, no significant white cells, bacteria present however squamous is present as well no urinary symptoms unlikely UTI.  Patient was given Bentyl, GI cocktail, Pepcid and IV fluids.  CT angio abdomen pelvis obtained and no evidence of acute findings.  Incidental pulmonary nodule noted patient updated regarding findings, pulmonary nodule is small, no further follow-up required per radiology.  Patient states she has some throat fullness, there is some initial concern that is potentially related to contrast however patient has tolerated contrast numerous times before without an allergy, suspect it may have been related to the GI cocktail containing lidocaine.  Did resolve without intervention.  Patient had no stigmata of allergic reaction with no angioedema, clear lungs, no new GI symptoms, no rash, no itching.  Patient felt well on final reassessment felt comfortable discharge.  Plan to represcribe Colesytramine and patient to continue medications and follow-up with GI.  Patient was explained findings, exam/study results, the importance of follow up and  the plan moving forward; patient verbalized understanding and agreement. All questions were answered and no new questions at this time. Patient will be discharged with strict return precautions, follow up plan with PCP/GI.    EKG reviewed by me: 3:38 PM sinus bradycardia, rate 58, prescriptive block with HI interval 204 ms, normal axis, normal QRS and QTc intervals, no STEMI, overall similar to prior EKGs.     ED Course as of 07/26/24 1157   Wed Jul 24, 2024 1927 Patient feels improved requesting food.   [JM]      ED Course User Index  [JM] Carlos Betts MD         Diagnoses as of 07/26/24 1157   Chronic abdominal pain         Chronic medical conditions affecting care listed in MDM. I independently reviewed imaging studies and agreed with radiology reads. I reviewed recent and relevant outside records including PCP notes, Prior discharge summaries, and prior radiology reports.    Procedure  Procedures    Diagnosis:   1.  Abdominal pain    Dispo: Discharged in stable condition      Disclaimer: Portions of this note were dictated by speech recognition. An attempt at proof reading was made to minimize errors. Minor errors in transcription may be present.  Please call if questions.     Carlos Betts MD  07/26/24 1157

## 2024-07-24 NOTE — ED TRIAGE NOTES
Patient to ED for c/o abdominal cramping, burping and diarrhea. Patient states she was really here with a dx of acid reflux. Patient denies CP and SOB at this time .

## 2024-07-25 LAB
ATRIAL RATE: 58 BPM
BACTERIA UR CULT: NORMAL
P AXIS: 60 DEGREES
P OFFSET: 173 MS
P ONSET: 120 MS
PR INTERVAL: 204 MS
Q ONSET: 222 MS
QRS COUNT: 10 BEATS
QRS DURATION: 72 MS
QT INTERVAL: 408 MS
QTC CALCULATION(BAZETT): 400 MS
QTC FREDERICIA: 403 MS
R AXIS: 53 DEGREES
T AXIS: 52 DEGREES
T OFFSET: 426 MS
VENTRICULAR RATE: 58 BPM

## 2024-07-29 LAB — BODY SURFACE AREA: 1.84 M2

## 2024-08-05 NOTE — RESULT ENCOUNTER NOTE
Heart monitor showed  average HR 72, no significant arrhythmia. She follows with cardiologist at [facility].

## 2024-08-06 ENCOUNTER — TELEPHONE (OUTPATIENT)
Dept: CARDIOLOGY | Facility: HOSPITAL | Age: 79
End: 2024-08-06

## 2024-08-06 ENCOUNTER — APPOINTMENT (OUTPATIENT)
Dept: GASTROENTEROLOGY | Facility: CLINIC | Age: 79
End: 2024-08-06
Payer: MEDICARE

## 2024-08-06 NOTE — TELEPHONE ENCOUNTER
----- Message from Kari Kelley sent at 8/5/2024  2:16 PM EDT -----  Heart monitor showed  average HR 72, no significant arrhythmia. She follows with cardiologist at [facility].

## 2024-08-21 ENCOUNTER — APPOINTMENT (OUTPATIENT)
Dept: GASTROENTEROLOGY | Facility: CLINIC | Age: 79
End: 2024-08-21
Payer: MEDICARE

## 2024-09-06 ENCOUNTER — APPOINTMENT (OUTPATIENT)
Dept: GASTROENTEROLOGY | Facility: CLINIC | Age: 79
End: 2024-09-06
Payer: MEDICARE

## 2024-09-12 ENCOUNTER — HOSPITAL ENCOUNTER (EMERGENCY)
Facility: HOSPITAL | Age: 79
Discharge: HOME | End: 2024-09-12
Attending: EMERGENCY MEDICINE
Payer: MEDICARE

## 2024-09-12 ENCOUNTER — APPOINTMENT (OUTPATIENT)
Dept: CARDIOLOGY | Facility: HOSPITAL | Age: 79
End: 2024-09-12
Payer: MEDICARE

## 2024-09-12 VITALS
RESPIRATION RATE: 18 BRPM | DIASTOLIC BLOOD PRESSURE: 61 MMHG | WEIGHT: 162 LBS | OXYGEN SATURATION: 99 % | SYSTOLIC BLOOD PRESSURE: 124 MMHG | HEART RATE: 79 BPM | BODY MASS INDEX: 27.66 KG/M2 | TEMPERATURE: 98.8 F | HEIGHT: 64 IN

## 2024-09-12 DIAGNOSIS — F43.21 GRIEF REACTION: ICD-10-CM

## 2024-09-12 DIAGNOSIS — F41.0 PANIC ATTACK: Primary | ICD-10-CM

## 2024-09-12 LAB
ALBUMIN SERPL BCP-MCNC: 3.7 G/DL (ref 3.4–5)
ALP SERPL-CCNC: 64 U/L (ref 33–136)
ALT SERPL W P-5'-P-CCNC: 14 U/L (ref 7–45)
ANION GAP SERPL CALC-SCNC: 17 MMOL/L (ref 10–20)
AST SERPL W P-5'-P-CCNC: 31 U/L (ref 9–39)
BASOPHILS # BLD AUTO: 0.02 X10*3/UL (ref 0–0.1)
BASOPHILS NFR BLD AUTO: 0.3 %
BILIRUB SERPL-MCNC: 0.8 MG/DL (ref 0–1.2)
BUN SERPL-MCNC: 14 MG/DL (ref 6–23)
CALCIUM SERPL-MCNC: 9.6 MG/DL (ref 8.6–10.3)
CARDIAC TROPONIN I PNL SERPL HS: 9 NG/L (ref 0–13)
CHLORIDE SERPL-SCNC: 100 MMOL/L (ref 98–107)
CO2 SERPL-SCNC: 25 MMOL/L (ref 21–32)
CREAT SERPL-MCNC: 1.05 MG/DL (ref 0.5–1.05)
EGFRCR SERPLBLD CKD-EPI 2021: 54 ML/MIN/1.73M*2
EOSINOPHIL # BLD AUTO: 0.1 X10*3/UL (ref 0–0.4)
EOSINOPHIL NFR BLD AUTO: 1.4 %
ERYTHROCYTE [DISTWIDTH] IN BLOOD BY AUTOMATED COUNT: 12.2 % (ref 11.5–14.5)
GLUCOSE SERPL-MCNC: 131 MG/DL (ref 74–99)
HCT VFR BLD AUTO: 44.3 % (ref 36–46)
HGB BLD-MCNC: 14.3 G/DL (ref 12–16)
IMM GRANULOCYTES # BLD AUTO: 0.02 X10*3/UL (ref 0–0.5)
IMM GRANULOCYTES NFR BLD AUTO: 0.3 % (ref 0–0.9)
LYMPHOCYTES # BLD AUTO: 2.37 X10*3/UL (ref 0.8–3)
LYMPHOCYTES NFR BLD AUTO: 32.6 %
MAGNESIUM SERPL-MCNC: 1.8 MG/DL (ref 1.6–2.4)
MCH RBC QN AUTO: 30.7 PG (ref 26–34)
MCHC RBC AUTO-ENTMCNC: 32.3 G/DL (ref 32–36)
MCV RBC AUTO: 95 FL (ref 80–100)
MONOCYTES # BLD AUTO: 0.8 X10*3/UL (ref 0.05–0.8)
MONOCYTES NFR BLD AUTO: 11 %
NEUTROPHILS # BLD AUTO: 3.95 X10*3/UL (ref 1.6–5.5)
NEUTROPHILS NFR BLD AUTO: 54.4 %
NRBC BLD-RTO: 0 /100 WBCS (ref 0–0)
PLATELET # BLD AUTO: 248 X10*3/UL (ref 150–450)
POTASSIUM SERPL-SCNC: 3.5 MMOL/L (ref 3.5–5.3)
PROT SERPL-MCNC: 7.1 G/DL (ref 6.4–8.2)
RBC # BLD AUTO: 4.66 X10*6/UL (ref 4–5.2)
SODIUM SERPL-SCNC: 138 MMOL/L (ref 136–145)
WBC # BLD AUTO: 7.3 X10*3/UL (ref 4.4–11.3)

## 2024-09-12 PROCEDURE — 99284 EMERGENCY DEPT VISIT MOD MDM: CPT | Mod: 25

## 2024-09-12 PROCEDURE — 2500000001 HC RX 250 WO HCPCS SELF ADMINISTERED DRUGS (ALT 637 FOR MEDICARE OP): Performed by: PHYSICIAN ASSISTANT

## 2024-09-12 PROCEDURE — 2500000004 HC RX 250 GENERAL PHARMACY W/ HCPCS (ALT 636 FOR OP/ED): Performed by: PHYSICIAN ASSISTANT

## 2024-09-12 PROCEDURE — 96374 THER/PROPH/DIAG INJ IV PUSH: CPT

## 2024-09-12 PROCEDURE — 85025 COMPLETE CBC W/AUTO DIFF WBC: CPT | Performed by: PHYSICIAN ASSISTANT

## 2024-09-12 PROCEDURE — 36415 COLL VENOUS BLD VENIPUNCTURE: CPT | Performed by: PHYSICIAN ASSISTANT

## 2024-09-12 PROCEDURE — 84484 ASSAY OF TROPONIN QUANT: CPT | Performed by: PHYSICIAN ASSISTANT

## 2024-09-12 PROCEDURE — 93005 ELECTROCARDIOGRAM TRACING: CPT

## 2024-09-12 PROCEDURE — 80053 COMPREHEN METABOLIC PANEL: CPT | Performed by: PHYSICIAN ASSISTANT

## 2024-09-12 PROCEDURE — 83735 ASSAY OF MAGNESIUM: CPT | Performed by: PHYSICIAN ASSISTANT

## 2024-09-12 RX ORDER — LORAZEPAM 2 MG/ML
0.5 INJECTION INTRAMUSCULAR ONCE
Status: COMPLETED | OUTPATIENT
Start: 2024-09-12 | End: 2024-09-12

## 2024-09-12 RX ORDER — LORAZEPAM 0.5 MG/1
0.5 TABLET ORAL ONCE
Status: COMPLETED | OUTPATIENT
Start: 2024-09-12 | End: 2024-09-12

## 2024-09-12 RX ORDER — LORAZEPAM 0.5 MG/1
0.5 TABLET ORAL EVERY 8 HOURS PRN
Qty: 9 TABLET | Refills: 0 | Status: SHIPPED | OUTPATIENT
Start: 2024-09-12 | End: 2024-09-15

## 2024-09-12 RX ADMIN — LORAZEPAM 0.5 MG: 2 INJECTION, SOLUTION INTRAMUSCULAR; INTRAVENOUS at 06:11

## 2024-09-12 RX ADMIN — LORAZEPAM 0.5 MG: 0.5 TABLET ORAL at 04:58

## 2024-09-12 ASSESSMENT — COLUMBIA-SUICIDE SEVERITY RATING SCALE - C-SSRS
1. IN THE PAST MONTH, HAVE YOU WISHED YOU WERE DEAD OR WISHED YOU COULD GO TO SLEEP AND NOT WAKE UP?: NO
2. HAVE YOU ACTUALLY HAD ANY THOUGHTS OF KILLING YOURSELF?: NO
6. HAVE YOU EVER DONE ANYTHING, STARTED TO DO ANYTHING, OR PREPARED TO DO ANYTHING TO END YOUR LIFE?: NO

## 2024-09-12 ASSESSMENT — PAIN SCALES - GENERAL
PAINLEVEL_OUTOF10: 0 - NO PAIN
PAINLEVEL_OUTOF10: 0 - NO PAIN

## 2024-09-12 ASSESSMENT — PAIN - FUNCTIONAL ASSESSMENT: PAIN_FUNCTIONAL_ASSESSMENT: 0-10

## 2024-09-12 NOTE — ED TRIAGE NOTES
Pt BIBA due to panic attack symptoms ( high blood pressure, heart rate and shakiness). Pt denies cp,sob and n/v . Pt stated that she has hx of anxiety and gerd.

## 2024-09-12 NOTE — ED PROVIDER NOTES
HPI   Chief Complaint   Patient presents with    Panic Attack       79y old female with panick attack. States she has had them before and recently has been under significant stress since deaths in her family and with her  who is considered family. Denies sob or chest pain, noted her HR was elevated but has since improved. Her pcp used to rx her ativan for panick and when flying but has since stopped. Denies other complaints.               Patient History   Past Medical History:   Diagnosis Date    Diabetes mellitus (Multi)     HLD (hyperlipidemia)     Hypertension      No past surgical history on file.  No family history on file.  Social History     Tobacco Use    Smoking status: Never     Passive exposure: Never    Smokeless tobacco: Never   Substance Use Topics    Alcohol use: Not Currently    Drug use: Not Currently       Physical Exam   ED Triage Vitals [09/12/24 0350]   Temperature Heart Rate Respirations BP   37.1 °C (98.8 °F) 77 19 160/73      Pulse Ox Temp Source Heart Rate Source Patient Position   99 % Oral -- Lying      BP Location FiO2 (%)     Right arm --       Physical Exam  Vitals reviewed.   Constitutional:       General: She is not in acute distress.     Appearance: Normal appearance. She is normal weight. She is not ill-appearing, toxic-appearing or diaphoretic.   HENT:      Head: Normocephalic and atraumatic.      Right Ear: External ear normal.      Left Ear: External ear normal.      Nose: Nose normal.      Mouth/Throat:      Mouth: Mucous membranes are moist.      Pharynx: No oropharyngeal exudate or posterior oropharyngeal erythema.   Eyes:      Extraocular Movements: Extraocular movements intact.      Conjunctiva/sclera: Conjunctivae normal.      Pupils: Pupils are equal, round, and reactive to light.   Cardiovascular:      Rate and Rhythm: Normal rate and regular rhythm.      Heart sounds: No murmur heard.  Pulmonary:      Effort: Pulmonary effort is normal. No respiratory distress.       Breath sounds: No stridor.   Abdominal:      General: There is no distension.      Tenderness: There is no abdominal tenderness. There is no guarding or rebound.   Musculoskeletal:         General: No swelling, tenderness or deformity.      Cervical back: Normal range of motion. No tenderness.   Skin:     General: Skin is warm.      Capillary Refill: Capillary refill takes less than 2 seconds.      Findings: No bruising or rash.   Neurological:      General: No focal deficit present.      Mental Status: She is alert and oriented to person, place, and time. Mental status is at baseline.   Psychiatric:         Mood and Affect: Mood normal.         Behavior: Behavior normal.         Thought Content: Thought content normal.         Judgment: Judgment normal.           ED Course & MDM   ED Course as of 09/12/24 0506   Thu Sep 12, 2024   0430 EKG interpreted by myself.  Normal sinus rhythm at a rate of 84 bpm.  Normal intervals.  Normal axis.  No signs of acute ischemia.   [EF]      ED Course User Index  [EF] Koby Washington MD         Diagnoses as of 09/12/24 0506   Panic attack   Grief reaction                 No data recorded     [place] Coma Scale Score: 15 (09/12/24 0355 : Jonas Calderon RN)                           Medical Decision Making  Ddx: acs, grief reaction    Oarrs reviewed, given 0.5mg ativan and plan to dc after workup. dc.         Amount and/or Complexity of Data Reviewed  ECG/medicine tests: independent interpretation performed.     Details: 84 bpm nsr no st elevation or depression.         Procedure  Procedures     Lucien Molina PA-C  09/12/24 3767

## 2024-09-14 LAB
ATRIAL RATE: 84 BPM
P AXIS: 67 DEGREES
P OFFSET: 179 MS
P ONSET: 123 MS
PR INTERVAL: 192 MS
Q ONSET: 219 MS
QRS COUNT: 14 BEATS
QRS DURATION: 78 MS
QT INTERVAL: 386 MS
QTC CALCULATION(BAZETT): 456 MS
QTC FREDERICIA: 431 MS
R AXIS: 55 DEGREES
T AXIS: 39 DEGREES
T OFFSET: 412 MS
VENTRICULAR RATE: 84 BPM

## 2024-09-27 ENCOUNTER — OFFICE VISIT (OUTPATIENT)
Dept: URGENT CARE | Age: 79
End: 2024-09-27
Payer: MEDICARE

## 2024-09-27 VITALS
HEART RATE: 70 BPM | HEIGHT: 64 IN | WEIGHT: 165.6 LBS | DIASTOLIC BLOOD PRESSURE: 73 MMHG | SYSTOLIC BLOOD PRESSURE: 130 MMHG | OXYGEN SATURATION: 95 % | BODY MASS INDEX: 28.27 KG/M2

## 2024-09-27 DIAGNOSIS — R10.30 LOWER ABDOMINAL PAIN: ICD-10-CM

## 2024-09-27 DIAGNOSIS — K21.9 GASTROESOPHAGEAL REFLUX DISEASE WITHOUT ESOPHAGITIS: Primary | ICD-10-CM

## 2024-09-27 RX ORDER — CHOLESTYRAMINE 4 G/9G
2 POWDER, FOR SUSPENSION ORAL DAILY
Qty: 15 PACKET | Refills: 0 | Status: SHIPPED | OUTPATIENT
Start: 2024-09-27 | End: 2024-10-27

## 2024-09-27 ASSESSMENT — ENCOUNTER SYMPTOMS: HEARTBURN: 1

## 2024-09-27 NOTE — PROGRESS NOTES
Subjective   Patient ID: Liv Hoskins is a 79 y.o. female. They present today with a chief complaint of Heartburn (Acid reflux, hard to eat and sleep. ).    History of Present Illness   Patient is a very pleasant 79-year-old -American female, past medical history of hypertension, hyperlipidemia, diabetes, presenting to the clinic for chief complaint of acid reflux.  Patient is reporting multiple day history of persistent acid reflux with burping and belching.  She was seen and evaluated in [facility] emergency department for these complaints and had a completely unremarkable cardiac workup about 1 week ago.  She was provided with cholestyramine which she has been using with good relief however has not run out and is requesting a refill.  She denies any chest pain or tightness.  No shortness of breath.  No abdominal pain, nausea, vomiting.  No fever or chills.  No further complaints.      Heartburn      Past Medical History  Allergies as of 09/27/2024 - Reviewed 09/27/2024   Allergen Reaction Noted    Cherry Anaphylaxis 10/21/2023    Shellfish derived Anaphylaxis 10/21/2023    Apple Hives 10/21/2023    Bee venom protein (honey bee) Swelling 10/21/2023    Codeine Hallucinations 10/21/2023    Latex Hives and Swelling 10/21/2023    Lipitor [atorvastatin] Myalgia 10/21/2023    Lisinopril Angioedema 10/21/2023    Macrolide antibiotics Swelling 10/21/2023    Peanut Other 09/27/2024    Penicillins Swelling 10/21/2023    Statins-hmg-coa reductase inhibitors Myalgia 10/21/2023    [place] Hives 10/21/2023    Zetia [ezetimibe] Headache 10/21/2023    Mold Rash 10/21/2023       (Not in a hospital admission)         Past Medical History:   Diagnosis Date    Diabetes mellitus (Multi)     HLD (hyperlipidemia)     Hypertension        History reviewed. No pertinent surgical history.     reports that she has never smoked. She has never been exposed to tobacco smoke. She has never used smokeless tobacco. She reports that she  "does not currently use alcohol. She reports that she does not currently use drugs.    Review of Systems  Review of Systems   Gastrointestinal:  Positive for heartburn.        All review of systems negative unless stated in HPI.                           Objective    Vitals:    09/27/24 0818   BP: 130/73   BP Location: Right arm   Patient Position: Sitting   BP Cuff Size: Adult   Pulse: 70   SpO2: 95%   Weight: 75.1 kg (165 lb 9.6 oz)   Height: 1.626 m (5' 4\")     No LMP recorded. Patient is postmenopausal.    Physical Exam  Constitutional: Alert, oriented, cooperative, in no acute distress. Appears well nourished and well hydrated.    Head: Normocephalic, atraumatic    Skin: Intact, dry skin. No lesions, rash, petechiae, or purpura.    Eyes: PERRL, EOMs intact, conjunctiva pink with no erythema or exudates. No scleral icterus. Eyelids without lesions.    ENT: Hearing grossly intact. No external deformities. Tympanic membranes intact with visible landmarks. Nares patent, mucus membranes moist. Dentition without lesions. Pharynx clear, uvula midline.    Neck: Trachea midline, no lymphadenopathy. No meningismus.     Pulmonary: Lungs clear to auscultation bilaterally with good chest wall excursion. No rales, rhonchi, or wheezing. No accessory muscle use or stridor.     Cardiac: Regular rate and rhythm. Normal S1 and S2 with no murmur, rub, or gallop. No JVD, carotids without bruits. 2+ DP and PT pulses.     Abdomen: Soft, nontender, normoactive bowel sounds. No palpable organomegaly or mass. No rebound or guarding. No CVA tenderness.     Genitourinary: Exam deferred.    Musculoskeletal: Full range of motion in extremities. No pain, edema, or deformities. Peripheral pulses full and equal. No cyanosis, or clubbing.     Neurological: Cranial nerves II through XII are grossly intact. Normal sensation, no weakness, no focal findings identified.     Psychiatric: Appropriate mood and affect. Calm.    Procedures    Point of " Care Test & Imaging Results from this visit    No results found.    Diagnostic study results (if any) were reviewed by Morris Gardner PA-C.    Assessment/Plan   Allergies, medications, history, and pertinent labs/EKGs/Imaging reviewed by Morris Gardner PA-C.     Medical Decision Making  Patient was seen eval in the clinic for chief complaint of acid reflux.  On exam patient is nontoxic very well-appearing resting bed comfortably no acute distress.  Vital signs are stable, afebrile.  Chest is clear, heart is regular.  Belly is diffusely soft and nontender with no guarding rebound or rigidity.  This does sound to be clinically consistent with her underlying acid reflux.  Given her recent unremarkable cardiac workup I minimal concern for ACS at this time.  She is provided with a refill of her cholestyramine will be discharged home at this time.  Advise she follow-up very close with her primary care physician in the next week.  She does have follow-up with gastroenterology scheduled for later next month as well.  I reviewed my impression, plan, and very strict return versus report to ED precautions with the patient.  She expresses understanding and agreement plan of care.    Orders and Diagnoses  Diagnoses and all orders for this visit:  Gastroesophageal reflux disease without esophagitis  Lower abdominal pain  -     cholestyramine (Questran) 4 gram powder; Take 0.5 packets (2 g) by mouth once daily.        Medical Admin Record      Follow Up Instructions  No follow-ups on file.    Patient disposition: Home    Electronically signed by Morris Gardner PA-C  11:23 AM

## 2024-12-17 ENCOUNTER — HOSPITAL ENCOUNTER (EMERGENCY)
Facility: HOSPITAL | Age: 79
Discharge: HOME | End: 2024-12-17
Attending: EMERGENCY MEDICINE
Payer: MEDICARE

## 2024-12-17 VITALS
HEIGHT: 64 IN | TEMPERATURE: 97.9 F | BODY MASS INDEX: 26.29 KG/M2 | WEIGHT: 154 LBS | OXYGEN SATURATION: 98 % | RESPIRATION RATE: 18 BRPM | SYSTOLIC BLOOD PRESSURE: 143 MMHG | DIASTOLIC BLOOD PRESSURE: 81 MMHG | HEART RATE: 83 BPM

## 2024-12-17 DIAGNOSIS — H57.89 PERIORBITAL SWELLING: ICD-10-CM

## 2024-12-17 DIAGNOSIS — L25.9 CONTACT DERMATITIS, UNSPECIFIED CONTACT DERMATITIS TYPE, UNSPECIFIED TRIGGER: Primary | ICD-10-CM

## 2024-12-17 LAB
ALBUMIN SERPL BCP-MCNC: 4 G/DL (ref 3.4–5)
ALP SERPL-CCNC: 67 U/L (ref 33–136)
ALT SERPL W P-5'-P-CCNC: 11 U/L (ref 7–45)
ANION GAP SERPL CALC-SCNC: 11 MMOL/L (ref 10–20)
APPEARANCE UR: CLEAR
AST SERPL W P-5'-P-CCNC: 19 U/L (ref 9–39)
BASOPHILS # BLD AUTO: 0.05 X10*3/UL (ref 0–0.1)
BASOPHILS NFR BLD AUTO: 0.5 %
BILIRUB SERPL-MCNC: 1.1 MG/DL (ref 0–1.2)
BILIRUB UR STRIP.AUTO-MCNC: NEGATIVE MG/DL
BUN SERPL-MCNC: 12 MG/DL (ref 6–23)
CALCIUM SERPL-MCNC: 9.9 MG/DL (ref 8.6–10.3)
CHLORIDE SERPL-SCNC: 102 MMOL/L (ref 98–107)
CO2 SERPL-SCNC: 28 MMOL/L (ref 21–32)
COLOR UR: COLORLESS
CREAT SERPL-MCNC: 1.02 MG/DL (ref 0.5–1.05)
EGFRCR SERPLBLD CKD-EPI 2021: 56 ML/MIN/1.73M*2
EOSINOPHIL # BLD AUTO: 0.21 X10*3/UL (ref 0–0.4)
EOSINOPHIL NFR BLD AUTO: 2 %
ERYTHROCYTE [DISTWIDTH] IN BLOOD BY AUTOMATED COUNT: 12.3 % (ref 11.5–14.5)
GLUCOSE SERPL-MCNC: 216 MG/DL (ref 74–99)
GLUCOSE UR STRIP.AUTO-MCNC: NORMAL MG/DL
HCT VFR BLD AUTO: 45.4 % (ref 36–46)
HGB BLD-MCNC: 14.7 G/DL (ref 12–16)
IMM GRANULOCYTES # BLD AUTO: 0.04 X10*3/UL (ref 0–0.5)
IMM GRANULOCYTES NFR BLD AUTO: 0.4 % (ref 0–0.9)
KETONES UR STRIP.AUTO-MCNC: NEGATIVE MG/DL
LEUKOCYTE ESTERASE UR QL STRIP.AUTO: ABNORMAL
LYMPHOCYTES # BLD AUTO: 2.67 X10*3/UL (ref 0.8–3)
LYMPHOCYTES NFR BLD AUTO: 26 %
MCH RBC QN AUTO: 31.3 PG (ref 26–34)
MCHC RBC AUTO-ENTMCNC: 32.4 G/DL (ref 32–36)
MCV RBC AUTO: 97 FL (ref 80–100)
MONOCYTES # BLD AUTO: 0.52 X10*3/UL (ref 0.05–0.8)
MONOCYTES NFR BLD AUTO: 5.1 %
NEUTROPHILS # BLD AUTO: 6.78 X10*3/UL (ref 1.6–5.5)
NEUTROPHILS NFR BLD AUTO: 66 %
NITRITE UR QL STRIP.AUTO: NEGATIVE
NRBC BLD-RTO: 0 /100 WBCS (ref 0–0)
PH UR STRIP.AUTO: 6 [PH]
PLATELET # BLD AUTO: 260 X10*3/UL (ref 150–450)
POTASSIUM SERPL-SCNC: 3.4 MMOL/L (ref 3.5–5.3)
PROT SERPL-MCNC: 7.6 G/DL (ref 6.4–8.2)
PROT UR STRIP.AUTO-MCNC: NEGATIVE MG/DL
RBC # BLD AUTO: 4.69 X10*6/UL (ref 4–5.2)
RBC # UR STRIP.AUTO: NEGATIVE /UL
RBC #/AREA URNS AUTO: NORMAL /HPF
SODIUM SERPL-SCNC: 138 MMOL/L (ref 136–145)
SP GR UR STRIP.AUTO: 1.01
UROBILINOGEN UR STRIP.AUTO-MCNC: NORMAL MG/DL
WBC # BLD AUTO: 10.3 X10*3/UL (ref 4.4–11.3)
WBC #/AREA URNS AUTO: NORMAL /HPF

## 2024-12-17 PROCEDURE — 96374 THER/PROPH/DIAG INJ IV PUSH: CPT

## 2024-12-17 PROCEDURE — 81001 URINALYSIS AUTO W/SCOPE: CPT | Performed by: EMERGENCY MEDICINE

## 2024-12-17 PROCEDURE — 80053 COMPREHEN METABOLIC PANEL: CPT | Performed by: EMERGENCY MEDICINE

## 2024-12-17 PROCEDURE — 2500000004 HC RX 250 GENERAL PHARMACY W/ HCPCS (ALT 636 FOR OP/ED): Performed by: EMERGENCY MEDICINE

## 2024-12-17 PROCEDURE — 99284 EMERGENCY DEPT VISIT MOD MDM: CPT | Mod: 25 | Performed by: EMERGENCY MEDICINE

## 2024-12-17 PROCEDURE — 2500000001 HC RX 250 WO HCPCS SELF ADMINISTERED DRUGS (ALT 637 FOR MEDICARE OP): Performed by: EMERGENCY MEDICINE

## 2024-12-17 PROCEDURE — 87086 URINE CULTURE/COLONY COUNT: CPT | Mod: AHULAB | Performed by: EMERGENCY MEDICINE

## 2024-12-17 PROCEDURE — 85025 COMPLETE CBC W/AUTO DIFF WBC: CPT | Performed by: EMERGENCY MEDICINE

## 2024-12-17 PROCEDURE — 36415 COLL VENOUS BLD VENIPUNCTURE: CPT | Performed by: EMERGENCY MEDICINE

## 2024-12-17 PROCEDURE — 96375 TX/PRO/DX INJ NEW DRUG ADDON: CPT

## 2024-12-17 RX ORDER — EPINEPHRINE 0.3 MG/.3ML
1 INJECTION SUBCUTANEOUS ONCE AS NEEDED
Qty: 2 EACH | Refills: 0 | Status: SHIPPED | OUTPATIENT
Start: 2024-12-17

## 2024-12-17 RX ORDER — FAMOTIDINE 20 MG/1
20 TABLET, FILM COATED ORAL DAILY
Qty: 7 TABLET | Refills: 0 | Status: SHIPPED | OUTPATIENT
Start: 2024-12-17 | End: 2024-12-24

## 2024-12-17 RX ORDER — DIPHENHYDRAMINE HYDROCHLORIDE 50 MG/ML
25 INJECTION INTRAMUSCULAR; INTRAVENOUS ONCE
Status: COMPLETED | OUTPATIENT
Start: 2024-12-17 | End: 2024-12-17

## 2024-12-17 RX ORDER — HYDROCORTISONE BUTYRATE 1 MG/ML
1 LOTION TOPICAL 2 TIMES DAILY
Qty: 59 ML | Refills: 0 | Status: SHIPPED | OUTPATIENT
Start: 2024-12-17

## 2024-12-17 RX ORDER — CETIRIZINE HYDROCHLORIDE 10 MG/1
10 TABLET ORAL ONCE
Status: COMPLETED | OUTPATIENT
Start: 2024-12-17 | End: 2024-12-17

## 2024-12-17 RX ORDER — FAMOTIDINE 10 MG/ML
20 INJECTION INTRAVENOUS ONCE
Status: COMPLETED | OUTPATIENT
Start: 2024-12-17 | End: 2024-12-17

## 2024-12-17 ASSESSMENT — PAIN SCALES - GENERAL: PAINLEVEL_OUTOF10: 0 - NO PAIN

## 2024-12-17 ASSESSMENT — PAIN - FUNCTIONAL ASSESSMENT: PAIN_FUNCTIONAL_ASSESSMENT: 0-10

## 2024-12-17 NOTE — ED PROVIDER NOTES
History/Exam limitations: none.   Additional history was obtained from patient and past medical records.          HPI:    Liv Hoskins is a 79 y.o. female PMH hypertension, diabetes, glaucoma, palpitations, hyperlipidemia, statin related myalgias, anaphylaxis presenting for evaluation of concerns of periorbital and midface discomfort and swelling and scratchy throat.  Patient states that she did get new candles as a gift recently which she typically avoids and took the lids off the candles just prior to onset of her symptoms.  She states that she does have a history of an allergic reaction to numerous things including scented items.  States that she has somewhat of a scratchy throat but no wheezing.  No abdominal pain nausea vomiting diarrhea.  Has had a similar reaction in the past 2 assented item.  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  This started yesterday evening around 5 PM and has been taking Benadryl consistently however this morning she continued to have symptoms so came in to be seen.  She states that the skin feels uncomfortable no clear itching.  Took benadryl 25mg liquid prior to coming to the ED.         Physical Exam:  ED Triage Vitals [12/17/24 1207]   Temperature Heart Rate Respirations BP   36.6 °C (97.9 °F) 94 16 158/85      Pulse Ox Temp src Heart Rate Source Patient Position   98 % -- -- --      BP Location FiO2 (%)     -- --        GEN:      Alert, NAD  Eyes:       PERRL, EOMI  HENT:      Erythema and subtle periorbital edema bilaterally, mild redness to the left midface with no swelling, no skin sloughing, no mucous membrane involvement, no tongue or retropharyngeal swelling, no tongue elevation or sublingual swelling/crepitus, OP clear, airway patent, MM  CV:      RRR, no MRG, no LE pitting edema, 2+ radial and pedal pulses  PULM:     CTAB, no w/r/r, easy WOB, symmetric chest rise  ABD:      Soft, NT, ND, no masses, BS +  NEURO:   A&Ox3, no focal deficits     MSK:      FROM, no joint deformities or swelling, no e/o trauma  SKIN:       Warm and dry  PSYCH:    Appropriate mood and affect         MDM/ED Course:   Liv Hoskins is a 79 y.o. female PMH hypertension, diabetes, glaucoma, palpitations, hyperlipidemia, statin related myalgias, anaphylaxis presenting for evaluation of concerns of periorbital and midface discomfort and swelling and scratchy throat.  Vitals and exam as documented above.  Patient reports.  Prior similar presentation secondary to scented candles.  Differential includes allergic reaction secondary to this.  Differential includes anaphylaxis however single system involvement, unlikely.  No new medications.  Considered angioedema secondary to medication, patient is on an ARB however the swelling is localized to the eyes and as below patient subsequently recalled that prior to onset she tried a new eye cream in the exact distribution where she has the swelling and erythema.  There is no crepitus or evidence of necrotizing soft tissue infection, no fluctuance or evidence of fluid collection.  Labs were obtained and CBC with no leukocytosis.  Chemistry overall reassuring.  Patient on arrival was given IV Solu-Medrol, IV Pepcid, and Benadryl and cetirizine.  Overall highest suspicion for contact dermatitis.  0.1% hydrocortisone cream prescribed.  Patient feels comfortable with discharge.  Remains well-appearing with single system involvement.  No lip tongue or oropharyngeal swelling.  If any continued symptoms outside of use of cream or other suspected contact dermatitis or other obvious allergic reaction triggers, advised to follow-up with primary care provider regarding losartan/other potential medication triggers.  Patient was explained findings, exam/study results, the importance of follow up and the plan moving forward; patient verbalized understanding and agreement. All questions were answered and no new questions at this time. Patient will be  discharged with strict return precautions, follow up plan with PCP.     ED Course as of 12/21/24 0943   Tue Dec 17, 2024   4357 Patient now recalls that she began using a new eye cream and a new face wash the day before her symptoms started and stopped using it today.  The area of redness and swelling is localized to the area where she placed this.  Suspect contact dermatitis.  Considered steroids but low suspicion for ingested allergen.  Considered possible losartan related symptoms, advise close follow-up with PCP.  Suspect contact dermatitis however given localized symptoms. [JM]      ED Course User Index  [JM] Carlos Betts MD         Diagnoses as of 12/21/24 0943   Contact dermatitis, unspecified contact dermatitis type, unspecified trigger   Periorbital swelling         Chronic medical conditions affecting care listed in MDM. I independently reviewed imaging studies and agreed with radiology reads. I reviewed recent and relevant outside records including PCP notes, Prior discharge summaries, and prior radiology reports.    Procedure  Procedures    Diagnosis:   1.  Contact dermatitis    Dispo: Discharged in stable condition      Disclaimer: Portions of this note were dictated by speech recognition. An attempt at proof reading was made to minimize errors. Minor errors in transcription may be present.  Please call if questions.     Carlos Betts MD  12/21/24 0948

## 2024-12-17 NOTE — ED TRIAGE NOTES
Allergic reaction she is having swelling and redness to her face no SOB noted but her throat feels a little scratchy

## 2024-12-17 NOTE — DISCHARGE INSTRUCTIONS
You were seen in the emergency room today for evaluation of allergic reaction.  Your area of reaction appears to be localized to where you apply a new cream, please defer using this.  Please use use hydrocortisone 0.1% cream once to twice daily for the next 1 to 2 weeks as prescribed.  Please return if you have any worsening symptoms, skin issues with tongue swelling, throat swelling, difficulty breathing, wheezing, nausea vomiting, or if you have any other concerns.  If you have 2 system involvement or difficulty breathing, please use EpiPen as prescribed.  Please discuss your losartan with your primary care provider, it is unlikely in this case it is causing your symptoms however given prior swelling with lisinopril is a consideration to discuss further.

## 2024-12-19 LAB — BACTERIA UR CULT: NORMAL

## 2025-05-31 ENCOUNTER — HOSPITAL ENCOUNTER (EMERGENCY)
Facility: HOSPITAL | Age: 80
Discharge: HOME | End: 2025-05-31
Attending: STUDENT IN AN ORGANIZED HEALTH CARE EDUCATION/TRAINING PROGRAM
Payer: MEDICARE

## 2025-05-31 ENCOUNTER — APPOINTMENT (OUTPATIENT)
Dept: CARDIOLOGY | Facility: HOSPITAL | Age: 80
End: 2025-05-31
Payer: MEDICARE

## 2025-05-31 VITALS
WEIGHT: 154 LBS | SYSTOLIC BLOOD PRESSURE: 138 MMHG | OXYGEN SATURATION: 97 % | BODY MASS INDEX: 28.34 KG/M2 | RESPIRATION RATE: 15 BRPM | HEART RATE: 72 BPM | HEIGHT: 62 IN | DIASTOLIC BLOOD PRESSURE: 76 MMHG | TEMPERATURE: 98.5 F

## 2025-05-31 DIAGNOSIS — R42 DIZZINESS: Primary | ICD-10-CM

## 2025-05-31 LAB
ANION GAP SERPL CALC-SCNC: 12 MMOL/L (ref 10–20)
BASOPHILS # BLD AUTO: 0.05 X10*3/UL (ref 0–0.1)
BASOPHILS NFR BLD AUTO: 0.7 %
BNP SERPL-MCNC: 42 PG/ML (ref 0–99)
BUN SERPL-MCNC: 14 MG/DL (ref 6–23)
CALCIUM SERPL-MCNC: 9.3 MG/DL (ref 8.6–10.3)
CARDIAC TROPONIN I PNL SERPL HS: 6 NG/L (ref 0–13)
CARDIAC TROPONIN I PNL SERPL HS: 7 NG/L (ref 0–13)
CHLORIDE SERPL-SCNC: 105 MMOL/L (ref 98–107)
CO2 SERPL-SCNC: 27 MMOL/L (ref 21–32)
CREAT SERPL-MCNC: 1.14 MG/DL (ref 0.5–1.05)
EGFRCR SERPLBLD CKD-EPI 2021: 49 ML/MIN/1.73M*2
EOSINOPHIL # BLD AUTO: 0.29 X10*3/UL (ref 0–0.4)
EOSINOPHIL NFR BLD AUTO: 3.9 %
ERYTHROCYTE [DISTWIDTH] IN BLOOD BY AUTOMATED COUNT: 12.4 % (ref 11.5–14.5)
GLUCOSE SERPL-MCNC: 197 MG/DL (ref 74–99)
HCT VFR BLD AUTO: 45.2 % (ref 36–46)
HGB BLD-MCNC: 14.4 G/DL (ref 12–16)
IMM GRANULOCYTES # BLD AUTO: 0.02 X10*3/UL (ref 0–0.5)
IMM GRANULOCYTES NFR BLD AUTO: 0.3 % (ref 0–0.9)
LYMPHOCYTES # BLD AUTO: 2.48 X10*3/UL (ref 0.8–3)
LYMPHOCYTES NFR BLD AUTO: 33.7 %
MAGNESIUM SERPL-MCNC: 1.84 MG/DL (ref 1.6–2.4)
MCH RBC QN AUTO: 30.6 PG (ref 26–34)
MCHC RBC AUTO-ENTMCNC: 31.9 G/DL (ref 32–36)
MCV RBC AUTO: 96 FL (ref 80–100)
MONOCYTES # BLD AUTO: 0.4 X10*3/UL (ref 0.05–0.8)
MONOCYTES NFR BLD AUTO: 5.4 %
NEUTROPHILS # BLD AUTO: 4.11 X10*3/UL (ref 1.6–5.5)
NEUTROPHILS NFR BLD AUTO: 56 %
NRBC BLD-RTO: 0 /100 WBCS (ref 0–0)
PLATELET # BLD AUTO: 261 X10*3/UL (ref 150–450)
POTASSIUM SERPL-SCNC: 4.4 MMOL/L (ref 3.5–5.3)
RBC # BLD AUTO: 4.7 X10*6/UL (ref 4–5.2)
SODIUM SERPL-SCNC: 140 MMOL/L (ref 136–145)
WBC # BLD AUTO: 7.4 X10*3/UL (ref 4.4–11.3)

## 2025-05-31 PROCEDURE — 83880 ASSAY OF NATRIURETIC PEPTIDE: CPT | Performed by: STUDENT IN AN ORGANIZED HEALTH CARE EDUCATION/TRAINING PROGRAM

## 2025-05-31 PROCEDURE — 93005 ELECTROCARDIOGRAM TRACING: CPT

## 2025-05-31 PROCEDURE — 2500000001 HC RX 250 WO HCPCS SELF ADMINISTERED DRUGS (ALT 637 FOR MEDICARE OP): Performed by: STUDENT IN AN ORGANIZED HEALTH CARE EDUCATION/TRAINING PROGRAM

## 2025-05-31 PROCEDURE — 36415 COLL VENOUS BLD VENIPUNCTURE: CPT | Performed by: STUDENT IN AN ORGANIZED HEALTH CARE EDUCATION/TRAINING PROGRAM

## 2025-05-31 PROCEDURE — 80048 BASIC METABOLIC PNL TOTAL CA: CPT | Performed by: STUDENT IN AN ORGANIZED HEALTH CARE EDUCATION/TRAINING PROGRAM

## 2025-05-31 PROCEDURE — 85025 COMPLETE CBC W/AUTO DIFF WBC: CPT | Performed by: STUDENT IN AN ORGANIZED HEALTH CARE EDUCATION/TRAINING PROGRAM

## 2025-05-31 PROCEDURE — 83735 ASSAY OF MAGNESIUM: CPT | Performed by: STUDENT IN AN ORGANIZED HEALTH CARE EDUCATION/TRAINING PROGRAM

## 2025-05-31 PROCEDURE — 84484 ASSAY OF TROPONIN QUANT: CPT | Performed by: STUDENT IN AN ORGANIZED HEALTH CARE EDUCATION/TRAINING PROGRAM

## 2025-05-31 PROCEDURE — 99284 EMERGENCY DEPT VISIT MOD MDM: CPT | Performed by: STUDENT IN AN ORGANIZED HEALTH CARE EDUCATION/TRAINING PROGRAM

## 2025-05-31 RX ORDER — HYDROXYZINE HYDROCHLORIDE 25 MG/1
25 TABLET, FILM COATED ORAL EVERY 8 HOURS PRN
Qty: 12 TABLET | Refills: 0 | Status: SHIPPED | OUTPATIENT
Start: 2025-05-31 | End: 2025-06-04

## 2025-05-31 RX ORDER — HYDROXYZINE HYDROCHLORIDE 25 MG/1
25 TABLET, FILM COATED ORAL ONCE
Status: COMPLETED | OUTPATIENT
Start: 2025-05-31 | End: 2025-05-31

## 2025-05-31 RX ADMIN — HYDROXYZINE HYDROCHLORIDE 25 MG: 25 TABLET, FILM COATED ORAL at 18:49

## 2025-05-31 ASSESSMENT — PAIN - FUNCTIONAL ASSESSMENT: PAIN_FUNCTIONAL_ASSESSMENT: 0-10

## 2025-05-31 ASSESSMENT — COLUMBIA-SUICIDE SEVERITY RATING SCALE - C-SSRS
2. HAVE YOU ACTUALLY HAD ANY THOUGHTS OF KILLING YOURSELF?: NO
6. HAVE YOU EVER DONE ANYTHING, STARTED TO DO ANYTHING, OR PREPARED TO DO ANYTHING TO END YOUR LIFE?: NO
1. IN THE PAST MONTH, HAVE YOU WISHED YOU WERE DEAD OR WISHED YOU COULD GO TO SLEEP AND NOT WAKE UP?: NO

## 2025-05-31 ASSESSMENT — PAIN SCALES - GENERAL: PAINLEVEL_OUTOF10: 0 - NO PAIN

## 2025-05-31 NOTE — DISCHARGE INSTRUCTIONS
You are seen in the emergency department for an episode of dizziness.  We discussed that your workup here in the emergency department is reassuring.  We discussed what to look out for when you have dizziness.  If you develop any chest pain, shortness of breath, dizziness while you are sitting at rest or not moving, or any nausea/vomiting please return the emergency department immediately.  Otherwise you may call your primary care doctor in the next few days to tell him what happened and see if he can get seen earlier.  We also prescribe you hydroxyzine, you may take this as needed up to 3 times a day For anxiety.

## 2025-05-31 NOTE — ED TRIAGE NOTES
PT TO ED VIA EMS FROM HOME FOR DIZZINESS AND HTN. PT STATES THAT HER BP IS USUALLY AROUND 107/64 HOWEVER AT HOME IT READ 143/83. PT STATES THAT SHE IS WORRIED THAT SHE MAY FALL FOR THE DIZZINESS. PT STATES THAT SHE FEELS SLIGHTLY STRESSED.    S/p Temporal lobectomy in 2007  Continue home Lamictal and Topamax

## 2025-05-31 NOTE — ED PROVIDER NOTES
"[facility] ED Note    Date of Service: 5/31/2025  Reason for Visit: Dizziness and Hypertension      Patient History     HPI  Liv Hoskins is a 79 y.o. female with past medical history of chronic diarrhea, HLD, HTN and diabetes who presents to the emergency department for a brief episode of dizziness.  Patient states that she is very on top of her health, is very conscious about what she eats and how she takes care of herself.  She states that she takes amlodipine and losartan for her blood pressure.  She states that she usually runs systolic mid 100s at home.  She states that she is walking the hallway, felt slightly dizzy, described as off-balance.  Patient states that she took her blood pressure and noticed it was systolic 141/83 and was concerned not only because she felt off blood because of her high blood pressure.  She states she did call 911 after this.  Patient states that she is unsure how long the dizziness lasted for.  She states that the dizziness is now gone.  She states that the dizziness did not improve when she sat down.  She denies any headache, nausea/vomiting, chest pain, shortness of breath, abdominal pain.  She denies any dysuria, hematuria, vaginal bleeding, vaginal discharge, lower leg swelling and rash.  She states this is never happened to her prior.      Medical History[1]  Surgical History[2]      Physical Exam     Vitals:    05/31/25 1549 05/31/25 1600 05/31/25 1730 05/31/25 1800   BP: 141/74 158/70 128/75 134/65   BP Location: Right arm  Right arm    Patient Position: Lying  Lying    Pulse: 68 68 64 60   Resp: 18 18 15 11   Temp: 36.9 °C (98.5 °F)      TempSrc: Oral      SpO2: 96% 97% 98% 98%   Weight: 69.9 kg (154 lb)      Height: 1.575 m (5' 2\")        General: Well-appearing, appears younger than stated age, nontoxic, sitting comfortably in the gurney no acute distress.  Pulmonary:   Non-labored breathing. Breath sounds clear bilaterally  Cardiac:  Regular rate   Abdomen: "  Soft. Non-tender. Non-distended  Musculoskeletal:  No long bone deformity. No peripheral edema   Skin:  Dry, no rashes  Neuro:  Alert and oriented x 4. CN II-XII intact. 5/5 strength in all 4 extremities. Sensation grossly intact to light touch. Speech and mentation normal.      Diagnostic Studies     Labs:  Please see EMR for labs obtained during this patient encounter.    Radiology:  Please see EMR for imaging obtained during this patient encounter.    EKG:  Normal sinus rhythm, ventricular rate of 62.  Normal axis.  Slightly prolonged W[place] interval of 208.  Normal ventricular conduction with a QRS duration of 72 and a QTc interval of 414.  No Q waves appreciated, normal ST segments, T wave flattening seen in lead aVF and slight T wave inversion seen in lead III.  Relatively unchanged from prior EKG in 9/12/2024.      ED Course and MDM     Liv Hoskins is a 79 y.o. female with a history and presentation as described above in HPI.      Upon presentation, the patient was afebrile, well-appearing, with unremarkable vital signs.  Patient presented to the emergency department after an episode of positional dizziness.  Differential diagnosis includes cardiac arrhythmia, stroke, hypovolemia, or possible BPPV.  Overall, unclear etiology of patient's brief dizziness.  Lower suspicion for stroke given absence of central symptoms, the dizziness was positional, did resolve at rest, with a nonfocal neurological examination, therefore I have lower suspicion for a central cause to her dizziness including stroke.  Patient's dizziness did last little bit longer than what would be expected for BPPV, therefore I have lower suspicion for this as the primary cause of her dizziness.  Patient did not have any chest pain, shortness of breath, no palpitations, with normal vital signs, EKG without any signs of ischemia arrhythmia and negative troponin x 2.  I have low suspicion for any cardiac etiology to her symptoms.  Patient's BMP,  CBC, BMP and magnesium were normal.  It is possible this is related to patient's blood pressure as she is usually systolic low 100s and today was 140.  She did report eating a lot of sodium yesterday which could be the contributor to her slightly higher blood pressure, she did not have any signs/symptoms to suggest hypertensive emergency at this time.  Given her reassuring workup here in the emergency department with resolution of her symptoms she is appropriate for discharge.  I did discuss return precautions with the patient including development of central vertigo or dizziness that does not resolve at rest, chest pain, shortness of breath, or nausea/vomiting.  Patient will plan to follow-up with her primary care doctor and was subsequently discharged.  Patient did state that she does have a history of anxiety, states that this may trigger her anxiety and requested some eyedrops again, I did give her a dose here and will prescribe her a short course of hydroxyzine for anxiety.        Impression     Diagnoses as of 05/31/25 1839   Dizziness        Plan       Discharge, see DCI provided      Keshawn Trivedi MD  [facility]           [1]   Past Medical History:  Diagnosis Date    Diabetes mellitus (Multi)     HLD (hyperlipidemia)     Hypertension    [2] No past surgical history on file.       Keshawn Trivedi MD  05/31/25 1839

## 2025-06-02 LAB
ATRIAL RATE: 62 BPM
P AXIS: -19 DEGREES
P OFFSET: 179 MS
P ONSET: 119 MS
PR INTERVAL: 208 MS
Q ONSET: 223 MS
QRS COUNT: 10 BEATS
QRS DURATION: 72 MS
QT INTERVAL: 408 MS
QTC CALCULATION(BAZETT): 414 MS
QTC FREDERICIA: 412 MS
R AXIS: 3 DEGREES
T AXIS: -10 DEGREES
T OFFSET: 427 MS
VENTRICULAR RATE: 62 BPM

## 2025-06-08 ENCOUNTER — APPOINTMENT (OUTPATIENT)
Dept: RADIOLOGY | Facility: HOSPITAL | Age: 80
End: 2025-06-08
Payer: MEDICARE

## 2025-06-08 ENCOUNTER — HOSPITAL ENCOUNTER (EMERGENCY)
Facility: HOSPITAL | Age: 80
Discharge: HOME | End: 2025-06-08
Attending: EMERGENCY MEDICINE
Payer: MEDICARE

## 2025-06-08 ENCOUNTER — APPOINTMENT (OUTPATIENT)
Dept: CARDIOLOGY | Facility: HOSPITAL | Age: 80
End: 2025-06-08
Payer: MEDICARE

## 2025-06-08 VITALS
WEIGHT: 151 LBS | HEART RATE: 57 BPM | SYSTOLIC BLOOD PRESSURE: 147 MMHG | RESPIRATION RATE: 16 BRPM | DIASTOLIC BLOOD PRESSURE: 56 MMHG | TEMPERATURE: 97.6 F | HEIGHT: 63 IN | BODY MASS INDEX: 26.75 KG/M2 | OXYGEN SATURATION: 98 %

## 2025-06-08 DIAGNOSIS — I10 HYPERTENSION, UNSPECIFIED TYPE: Primary | ICD-10-CM

## 2025-06-08 LAB
ALBUMIN SERPL BCP-MCNC: 4.1 G/DL (ref 3.4–5)
ALP SERPL-CCNC: 71 U/L (ref 33–136)
ALT SERPL W P-5'-P-CCNC: 18 U/L (ref 7–45)
ANION GAP SERPL CALC-SCNC: 13 MMOL/L (ref 10–20)
APPEARANCE UR: CLEAR
AST SERPL W P-5'-P-CCNC: 22 U/L (ref 9–39)
BASOPHILS # BLD AUTO: 0.05 X10*3/UL (ref 0–0.1)
BASOPHILS NFR BLD AUTO: 0.6 %
BILIRUB SERPL-MCNC: 0.9 MG/DL (ref 0–1.2)
BILIRUB UR STRIP.AUTO-MCNC: NEGATIVE MG/DL
BUN SERPL-MCNC: 13 MG/DL (ref 6–23)
CALCIUM SERPL-MCNC: 9.1 MG/DL (ref 8.6–10.3)
CARDIAC TROPONIN I PNL SERPL HS: 7 NG/L (ref 0–13)
CARDIAC TROPONIN I PNL SERPL HS: 8 NG/L (ref 0–13)
CHLORIDE SERPL-SCNC: 98 MMOL/L (ref 98–107)
CO2 SERPL-SCNC: 26 MMOL/L (ref 21–32)
COLOR UR: COLORLESS
CREAT SERPL-MCNC: 0.94 MG/DL (ref 0.5–1.05)
EGFRCR SERPLBLD CKD-EPI 2021: 61 ML/MIN/1.73M*2
EOSINOPHIL # BLD AUTO: 0.08 X10*3/UL (ref 0–0.4)
EOSINOPHIL NFR BLD AUTO: 1 %
ERYTHROCYTE [DISTWIDTH] IN BLOOD BY AUTOMATED COUNT: 12.4 % (ref 11.5–14.5)
GLUCOSE SERPL-MCNC: 275 MG/DL (ref 74–99)
GLUCOSE UR STRIP.AUTO-MCNC: ABNORMAL MG/DL
HCT VFR BLD AUTO: 44 % (ref 36–46)
HGB BLD-MCNC: 14.3 G/DL (ref 12–16)
IMM GRANULOCYTES # BLD AUTO: 0.04 X10*3/UL (ref 0–0.5)
IMM GRANULOCYTES NFR BLD AUTO: 0.5 % (ref 0–0.9)
KETONES UR STRIP.AUTO-MCNC: NEGATIVE MG/DL
LEUKOCYTE ESTERASE UR QL STRIP.AUTO: ABNORMAL
LYMPHOCYTES # BLD AUTO: 2.03 X10*3/UL (ref 0.8–3)
LYMPHOCYTES NFR BLD AUTO: 24.5 %
MCH RBC QN AUTO: 30.6 PG (ref 26–34)
MCHC RBC AUTO-ENTMCNC: 32.5 G/DL (ref 32–36)
MCV RBC AUTO: 94 FL (ref 80–100)
MONOCYTES # BLD AUTO: 0.41 X10*3/UL (ref 0.05–0.8)
MONOCYTES NFR BLD AUTO: 4.9 %
NEUTROPHILS # BLD AUTO: 5.68 X10*3/UL (ref 1.6–5.5)
NEUTROPHILS NFR BLD AUTO: 68.5 %
NITRITE UR QL STRIP.AUTO: NEGATIVE
NRBC BLD-RTO: 0 /100 WBCS (ref 0–0)
PH UR STRIP.AUTO: 7 [PH]
PLATELET # BLD AUTO: 267 X10*3/UL (ref 150–450)
POTASSIUM SERPL-SCNC: 3.8 MMOL/L (ref 3.5–5.3)
PROT SERPL-MCNC: 7 G/DL (ref 6.4–8.2)
PROT UR STRIP.AUTO-MCNC: NEGATIVE MG/DL
RBC # BLD AUTO: 4.67 X10*6/UL (ref 4–5.2)
RBC # UR STRIP.AUTO: ABNORMAL MG/DL
RBC #/AREA URNS AUTO: NORMAL /HPF
SODIUM SERPL-SCNC: 133 MMOL/L (ref 136–145)
SP GR UR STRIP.AUTO: 1
UROBILINOGEN UR STRIP.AUTO-MCNC: NORMAL MG/DL
WBC # BLD AUTO: 8.3 X10*3/UL (ref 4.4–11.3)
WBC #/AREA URNS AUTO: NORMAL /HPF

## 2025-06-08 PROCEDURE — 93005 ELECTROCARDIOGRAM TRACING: CPT

## 2025-06-08 PROCEDURE — 80053 COMPREHEN METABOLIC PANEL: CPT | Performed by: EMERGENCY MEDICINE

## 2025-06-08 PROCEDURE — 70450 CT HEAD/BRAIN W/O DYE: CPT

## 2025-06-08 PROCEDURE — 36415 COLL VENOUS BLD VENIPUNCTURE: CPT | Performed by: EMERGENCY MEDICINE

## 2025-06-08 PROCEDURE — 87086 URINE CULTURE/COLONY COUNT: CPT | Mod: AHULAB | Performed by: EMERGENCY MEDICINE

## 2025-06-08 PROCEDURE — 2500000004 HC RX 250 GENERAL PHARMACY W/ HCPCS (ALT 636 FOR OP/ED): Performed by: EMERGENCY MEDICINE

## 2025-06-08 PROCEDURE — 96374 THER/PROPH/DIAG INJ IV PUSH: CPT

## 2025-06-08 PROCEDURE — 84484 ASSAY OF TROPONIN QUANT: CPT | Performed by: EMERGENCY MEDICINE

## 2025-06-08 PROCEDURE — 70450 CT HEAD/BRAIN W/O DYE: CPT | Performed by: RADIOLOGY

## 2025-06-08 PROCEDURE — 2500000001 HC RX 250 WO HCPCS SELF ADMINISTERED DRUGS (ALT 637 FOR MEDICARE OP): Performed by: EMERGENCY MEDICINE

## 2025-06-08 PROCEDURE — 99285 EMERGENCY DEPT VISIT HI MDM: CPT | Mod: 25 | Performed by: EMERGENCY MEDICINE

## 2025-06-08 PROCEDURE — 81001 URINALYSIS AUTO W/SCOPE: CPT | Performed by: EMERGENCY MEDICINE

## 2025-06-08 PROCEDURE — 85025 COMPLETE CBC W/AUTO DIFF WBC: CPT | Performed by: EMERGENCY MEDICINE

## 2025-06-08 RX ORDER — KETOROLAC TROMETHAMINE 30 MG/ML
15 INJECTION, SOLUTION INTRAMUSCULAR; INTRAVENOUS ONCE
Status: COMPLETED | OUTPATIENT
Start: 2025-06-08 | End: 2025-06-08

## 2025-06-08 RX ORDER — ACETAMINOPHEN 325 MG/1
975 TABLET ORAL ONCE
Status: COMPLETED | OUTPATIENT
Start: 2025-06-08 | End: 2025-06-08

## 2025-06-08 RX ADMIN — ACETAMINOPHEN 975 MG: 325 TABLET, FILM COATED ORAL at 20:32

## 2025-06-08 RX ADMIN — KETOROLAC TROMETHAMINE 15 MG: 30 INJECTION, SOLUTION INTRAMUSCULAR; INTRAVENOUS at 21:42

## 2025-06-08 ASSESSMENT — PAIN SCALES - GENERAL: PAINLEVEL_OUTOF10: 6

## 2025-06-08 ASSESSMENT — PAIN DESCRIPTION - LOCATION: LOCATION: HEAD

## 2025-06-08 ASSESSMENT — PAIN - FUNCTIONAL ASSESSMENT: PAIN_FUNCTIONAL_ASSESSMENT: 0-10

## 2025-06-08 NOTE — ED TRIAGE NOTES
Pt to the ED from home for elevated BP with dizziness and headache, pt takes meds for BP and is compliant, seen here earlier this week for the same,  pts /70 in triage

## 2025-06-09 LAB
ATRIAL RATE: 64 BPM
P AXIS: 76 DEGREES
P OFFSET: 180 MS
P ONSET: 124 MS
PR INTERVAL: 196 MS
Q ONSET: 222 MS
QRS COUNT: 10 BEATS
QRS DURATION: 84 MS
QT INTERVAL: 408 MS
QTC CALCULATION(BAZETT): 420 MS
QTC FREDERICIA: 416 MS
R AXIS: 71 DEGREES
T AXIS: 70 DEGREES
T OFFSET: 426 MS
VENTRICULAR RATE: 64 BPM

## 2025-06-09 NOTE — ED NOTES
Patient discharged at this time and walked out of emergency department. AVS reviewed and given to patient before discharge. IV removed, VS obtained and stable before departure. All education provided and outstanding questions answered.        Marlene Springer RN  06/08/25 4028

## 2025-06-09 NOTE — ED PROVIDER NOTES
Emergency Department Provider Note     HPI  Patient is an 79-year-old female with a past medical history significant for hypertension, hyperlipidemia and diabetes who presented to the emergency room with a chief complaint of hypertension.  She also felt a little lightheaded.  She has developed a headache since being here and thinks it might be from her TMJ because it starts at the jaw on the right side and feels like a TMJ headache.  They denies any field cuts, diplopia, focal weakness.  Has no nausea and vomiting.  She called EMS when she saw that her blood pressure was elevated but was able to go down the elevator and meet them in the lobby of her building without difficulty.  She was seen here recently for similar symptoms.  At that time she was discharged to follow-up as an outpatient and was seen by the nurse practitioner who did not change her medications.  She took them today this afternoon shortly prior to coming in and her blood pressure is already significantly improved without intervention here in the ED.  She has no meningismus, fevers, chest pain, shortness of breath, oliguria or any other complaints at this time.  She has not taken anything for her headache because she does not like to take medications for her headaches. Of note, patient states that she did eat a meal that had a lot of sodium today prior to her blood pressure spiking.       PMHx: As above  PSHx: Denies pertinent  FamilyHx: Denies pertinent  SocialHx: Denies  Allergies: Per EMR  Medications: See Medication Reconciliation     ROS  As above otherwise denies      Physical Exam    GENERAL: Awake and Alert, No Acute Distress  HEENT: AT/NC, PERRL, EOMI, Normal Oropharynx, No Signs of Dehydration  NECK: Normal Inspection, No JVD  CARDIOVASCULAR: RRR, No M/R/G  RESPIRATORY: CTA Bilaterally, No Wheezes, Rales or Rhonchi, Chest Wall Non-tender  ABDOMEN: Soft, non-tender abdomen, Normal Bowel Sounds, No Distention  BACK: No CVA Tenderness  SKIN:  Normal Color, Warm, Dry, No Rashes   EXTREMITIES: Non-Tender, Full ROM, No Pedal Edema  NEURO: A&O x 3, Normal Motor and Sensation, Normal Mood and Affect    Nursing Assessment and Vitals Reviewed    EKG showed a normal sinus rhythm at 64 bpm.  There are no significant interval prolongations or ischemic ST changes.  No axis deviation.    Medical Decision  Patient is an 79-year-old female with a past medical history significant for hypertension, hyperlipidemia and diabetes who presented to the emergency room with a chief complaint of hypertension.  She also felt a little lightheaded.  She has developed a headache since being here and thinks it might be from her TMJ because it starts at the jaw on the right side and feels like a TMJ headache.  They denies any field cuts, diplopia, focal weakness.  Has no nausea and vomiting.  She called EMS when she saw that her blood pressure was elevated but was able to go down the elevator and meet them in the lobby of her building without difficulty.  She was seen here recently for similar symptoms.  At that time she was discharged to follow-up as an outpatient and was seen by the nurse practitioner who did not change her medications.  She took them today this afternoon shortly prior to coming in and her blood pressure is already significantly improved without intervention here in the ED.  She has no meningismus, fevers, chest pain, shortness of breath, oliguria or any other complaints at this time.  She has not taken anything for her headache because she does not like to take medications for her headaches. Of note, patient states that she did eat a meal that had a lot of sodium today prior to her blood pressure spiking.     On evaluation she is extremely well-appearing and in no acute distress.  Lungs are clear and heart is regular.  Abdomen is soft nontender nondistended.  She is neurologically intact without any signs of posterior infarct, ataxia, visual field cuts or diplopia.   Low suspicion for emergent pathology but given her headache and significant hypertension prior to arrival CT is ordered.    Workup for patient included labs that revealed mild hyperglycemia without signs of acidosis.  No emergent electrolyte imbalances noted.  Troponin is negative x 2.  She has no leukocytosis or ROM.  CT head showed no acute intracranial pathology but chronic changes per report.    On reevaluation she remains very well-appearing and in no acute distress.  Low suspicion for posterior stroke at this time given reassuring exam.    Patient is discharged in stable condition with instructions to continue taking her medications as indicated.  She will follow-up with her primary care doctor.  She is educated on signs and symptoms that should prompt immediate turn to emergency room.    Aisha Munoz MD  Emergency Medicine                                                       Aisha Munoz MD  06/08/25 2126       Aisha Munoz MD  06/08/25 2127       Aisha Munoz MD  06/08/25 2128

## 2025-06-09 NOTE — DISCHARGE INSTRUCTIONS
Please make sure to follow-up with your primary care physician.  Continue to take your medications as indicated.  Return immediately if concerning symptoms, as discussed.

## 2025-06-10 LAB — BACTERIA UR CULT: NORMAL

## 2025-07-13 ENCOUNTER — APPOINTMENT (OUTPATIENT)
Dept: RADIOLOGY | Facility: HOSPITAL | Age: 80
End: 2025-07-13
Payer: MEDICARE

## 2025-07-13 ENCOUNTER — APPOINTMENT (OUTPATIENT)
Dept: CARDIOLOGY | Facility: HOSPITAL | Age: 80
End: 2025-07-13
Payer: MEDICARE

## 2025-07-13 ENCOUNTER — HOSPITAL ENCOUNTER (OUTPATIENT)
Facility: HOSPITAL | Age: 80
Setting detail: OBSERVATION
Discharge: HOME | End: 2025-07-14
Attending: EMERGENCY MEDICINE | Admitting: INTERNAL MEDICINE
Payer: MEDICARE

## 2025-07-13 DIAGNOSIS — K29.70 GASTRITIS WITHOUT BLEEDING, UNSPECIFIED CHRONICITY, UNSPECIFIED GASTRITIS TYPE: ICD-10-CM

## 2025-07-13 DIAGNOSIS — R79.89 ELEVATED TROPONIN: Primary | ICD-10-CM

## 2025-07-13 DIAGNOSIS — R63.4 WEIGHT LOSS: ICD-10-CM

## 2025-07-13 DIAGNOSIS — E87.20 LACTIC ACIDOSIS: ICD-10-CM

## 2025-07-13 DIAGNOSIS — R10.30 LOWER ABDOMINAL PAIN: ICD-10-CM

## 2025-07-13 DIAGNOSIS — R19.7 DIARRHEA, UNSPECIFIED TYPE: ICD-10-CM

## 2025-07-13 LAB
ALBUMIN SERPL BCP-MCNC: 4 G/DL (ref 3.4–5)
ALP SERPL-CCNC: 49 U/L (ref 33–136)
ALT SERPL W P-5'-P-CCNC: 12 U/L (ref 7–45)
ANION GAP SERPL CALC-SCNC: 15 MMOL/L (ref 10–20)
AST SERPL W P-5'-P-CCNC: 22 U/L (ref 9–39)
BASOPHILS # BLD AUTO: 0.06 X10*3/UL (ref 0–0.1)
BASOPHILS NFR BLD AUTO: 0.8 %
BILIRUB SERPL-MCNC: 1.4 MG/DL (ref 0–1.2)
BUN SERPL-MCNC: 9 MG/DL (ref 6–23)
CALCIUM SERPL-MCNC: 10.2 MG/DL (ref 8.6–10.3)
CARDIAC TROPONIN I PNL SERPL HS: 12 NG/L (ref 0–13)
CARDIAC TROPONIN I PNL SERPL HS: 22 NG/L (ref 0–13)
CARDIAC TROPONIN I PNL SERPL HS: 31 NG/L (ref 0–13)
CHLORIDE SERPL-SCNC: 99 MMOL/L (ref 98–107)
CO2 SERPL-SCNC: 26 MMOL/L (ref 21–32)
CREAT SERPL-MCNC: 1.04 MG/DL (ref 0.5–1.05)
EGFRCR SERPLBLD CKD-EPI 2021: 54 ML/MIN/1.73M*2
EOSINOPHIL # BLD AUTO: 0.13 X10*3/UL (ref 0–0.4)
EOSINOPHIL NFR BLD AUTO: 1.6 %
ERYTHROCYTE [DISTWIDTH] IN BLOOD BY AUTOMATED COUNT: 12.8 % (ref 11.5–14.5)
GLUCOSE SERPL-MCNC: 301 MG/DL (ref 74–99)
HCT VFR BLD AUTO: 44.2 % (ref 36–46)
HGB BLD-MCNC: 14.4 G/DL (ref 12–16)
IMM GRANULOCYTES # BLD AUTO: 0.02 X10*3/UL (ref 0–0.5)
IMM GRANULOCYTES NFR BLD AUTO: 0.3 % (ref 0–0.9)
LACTATE SERPL-SCNC: 1.4 MMOL/L (ref 0.4–2)
LACTATE SERPL-SCNC: 3 MMOL/L (ref 0.4–2)
LACTATE SERPL-SCNC: 3.1 MMOL/L (ref 0.4–2)
LIPASE SERPL-CCNC: 20 U/L (ref 9–82)
LYMPHOCYTES # BLD AUTO: 2.07 X10*3/UL (ref 0.8–3)
LYMPHOCYTES NFR BLD AUTO: 26 %
MAGNESIUM SERPL-MCNC: 1.54 MG/DL (ref 1.6–2.4)
MCH RBC QN AUTO: 31.3 PG (ref 26–34)
MCHC RBC AUTO-ENTMCNC: 32.6 G/DL (ref 32–36)
MCV RBC AUTO: 96 FL (ref 80–100)
MONOCYTES # BLD AUTO: 0.49 X10*3/UL (ref 0.05–0.8)
MONOCYTES NFR BLD AUTO: 6.1 %
NEUTROPHILS # BLD AUTO: 5.2 X10*3/UL (ref 1.6–5.5)
NEUTROPHILS NFR BLD AUTO: 65.2 %
NRBC BLD-RTO: 0 /100 WBCS (ref 0–0)
PLATELET # BLD AUTO: 243 X10*3/UL (ref 150–450)
POTASSIUM SERPL-SCNC: 4.1 MMOL/L (ref 3.5–5.3)
PROT SERPL-MCNC: 7.3 G/DL (ref 6.4–8.2)
RBC # BLD AUTO: 4.6 X10*6/UL (ref 4–5.2)
SODIUM SERPL-SCNC: 136 MMOL/L (ref 136–145)
TSH SERPL-ACNC: 0.48 MIU/L (ref 0.44–3.98)
WBC # BLD AUTO: 8 X10*3/UL (ref 4.4–11.3)

## 2025-07-13 PROCEDURE — 74177 CT ABD & PELVIS W/CONTRAST: CPT | Performed by: RADIOLOGY

## 2025-07-13 PROCEDURE — 83735 ASSAY OF MAGNESIUM: CPT | Performed by: EMERGENCY MEDICINE

## 2025-07-13 PROCEDURE — 80053 COMPREHEN METABOLIC PANEL: CPT | Performed by: EMERGENCY MEDICINE

## 2025-07-13 PROCEDURE — 2550000001 HC RX 255 CONTRASTS: Performed by: EMERGENCY MEDICINE

## 2025-07-13 PROCEDURE — 84484 ASSAY OF TROPONIN QUANT: CPT | Performed by: EMERGENCY MEDICINE

## 2025-07-13 PROCEDURE — 2500000001 HC RX 250 WO HCPCS SELF ADMINISTERED DRUGS (ALT 637 FOR MEDICARE OP): Performed by: EMERGENCY MEDICINE

## 2025-07-13 PROCEDURE — 93005 ELECTROCARDIOGRAM TRACING: CPT

## 2025-07-13 PROCEDURE — 36415 COLL VENOUS BLD VENIPUNCTURE: CPT | Performed by: EMERGENCY MEDICINE

## 2025-07-13 PROCEDURE — 83690 ASSAY OF LIPASE: CPT | Performed by: EMERGENCY MEDICINE

## 2025-07-13 PROCEDURE — 85025 COMPLETE CBC W/AUTO DIFF WBC: CPT | Performed by: EMERGENCY MEDICINE

## 2025-07-13 PROCEDURE — 71046 X-RAY EXAM CHEST 2 VIEWS: CPT

## 2025-07-13 PROCEDURE — 99285 EMERGENCY DEPT VISIT HI MDM: CPT | Mod: 25 | Performed by: EMERGENCY MEDICINE

## 2025-07-13 PROCEDURE — 84443 ASSAY THYROID STIM HORMONE: CPT | Performed by: EMERGENCY MEDICINE

## 2025-07-13 PROCEDURE — 83605 ASSAY OF LACTIC ACID: CPT | Performed by: EMERGENCY MEDICINE

## 2025-07-13 PROCEDURE — 96375 TX/PRO/DX INJ NEW DRUG ADDON: CPT | Mod: 59

## 2025-07-13 PROCEDURE — 74177 CT ABD & PELVIS W/CONTRAST: CPT

## 2025-07-13 PROCEDURE — 96361 HYDRATE IV INFUSION ADD-ON: CPT | Mod: 59

## 2025-07-13 PROCEDURE — 2500000004 HC RX 250 GENERAL PHARMACY W/ HCPCS (ALT 636 FOR OP/ED): Performed by: EMERGENCY MEDICINE

## 2025-07-13 PROCEDURE — 71046 X-RAY EXAM CHEST 2 VIEWS: CPT | Performed by: RADIOLOGY

## 2025-07-13 RX ORDER — MAGNESIUM SULFATE HEPTAHYDRATE 40 MG/ML
2 INJECTION, SOLUTION INTRAVENOUS ONCE
Status: COMPLETED | OUTPATIENT
Start: 2025-07-13 | End: 2025-07-14

## 2025-07-13 RX ORDER — LORAZEPAM 1 MG/1
1 TABLET ORAL ONCE
Status: COMPLETED | OUTPATIENT
Start: 2025-07-13 | End: 2025-07-13

## 2025-07-13 RX ORDER — PANTOPRAZOLE SODIUM 40 MG/10ML
40 INJECTION, POWDER, LYOPHILIZED, FOR SOLUTION INTRAVENOUS DAILY
Status: DISCONTINUED | OUTPATIENT
Start: 2025-07-14 | End: 2025-07-14 | Stop reason: HOSPADM

## 2025-07-13 RX ORDER — FAMOTIDINE 10 MG/ML
20 INJECTION, SOLUTION INTRAVENOUS ONCE
Status: COMPLETED | OUTPATIENT
Start: 2025-07-13 | End: 2025-07-13

## 2025-07-13 RX ORDER — ONDANSETRON HYDROCHLORIDE 2 MG/ML
4 INJECTION, SOLUTION INTRAVENOUS ONCE
Status: COMPLETED | OUTPATIENT
Start: 2025-07-13 | End: 2025-07-13

## 2025-07-13 RX ADMIN — ONDANSETRON 4 MG: 2 INJECTION, SOLUTION INTRAMUSCULAR; INTRAVENOUS at 19:01

## 2025-07-13 RX ADMIN — LORAZEPAM 1 MG: 1 TABLET ORAL at 19:21

## 2025-07-13 RX ADMIN — SODIUM CHLORIDE, SODIUM LACTATE, POTASSIUM CHLORIDE, AND CALCIUM CHLORIDE 1000 ML: .6; .31; .03; .02 INJECTION, SOLUTION INTRAVENOUS at 19:00

## 2025-07-13 RX ADMIN — IOHEXOL 75 ML: 350 INJECTION, SOLUTION INTRAVENOUS at 20:01

## 2025-07-13 RX ADMIN — FAMOTIDINE 20 MG: 10 INJECTION, SOLUTION INTRAVENOUS at 19:01

## 2025-07-13 ASSESSMENT — PAIN DESCRIPTION - LOCATION: LOCATION: HEAD

## 2025-07-13 ASSESSMENT — PAIN - FUNCTIONAL ASSESSMENT: PAIN_FUNCTIONAL_ASSESSMENT: 0-10

## 2025-07-13 ASSESSMENT — PAIN SCALES - GENERAL: PAINLEVEL_OUTOF10: 5 - MODERATE PAIN

## 2025-07-13 NOTE — ED TRIAGE NOTES
Pt presents to the ED from home via EMS with c/o abdominal pain and anxiety. Pt has had on and off abdominal pain for the last month with increased reflux. Pt has a hx of anxiety and appears anxious at this time.

## 2025-07-13 NOTE — ED PROVIDER NOTES
Emergency Department Provider Note             History of Present Illness   CC: Abdominal Pain    History provided by: Patient  Limitations to History: None  External Records Reviewed: prior ED provider notes, clinic notes, discharge summaries    HPI:  Liv Hoskins is a 80 y.o. female with history including hypertension, hyperlipidemia, non-insulin-dependent diabetes, GERD, open angle glaucoma presenting to the emergency department for abdominal pain, nausea, 20 lb unintentional weight loss, and increased reflux over the past month. She's also developed a dry cough. States she saw GI who started her on a PPI and cholestyramine which has not helped. Denies any new medications otherwise. She denies fever, chills, chest pain, dyspnea, diarrhea, constipation, urinary symptoms, focal deficits.     Physical Exam   Triage vitals:  T    HR 94  /60  RR 20  O2 99 %      General: awake, well-appearing, no distress  Head: normocephalic, atraumatic  Eyes: pupils equal, extraocular movements grossly intact, no conjunctival injection or scleral icterus  ENT: nares patent, moist mucous membranes  Neck: supple, trachea midline, no masses  CV: regular rate and rhythm, well-perfused  Resp: breathing is non-labored, speaking in full sentences. Lungs are clear to auscultation bilaterally  GI: soft, non-distended, diffuse tenderness worse in left mid-lower abdomen, no rebound or guarding  Extremities: no edema, no gross deformity   Neuro: alert, oriented, speech is fluent, face is symmetric, moving all extremities   Psych: Appropriate mood and affect    ED Course & Medical Decision Making     80 y.o. female with history including hypertension, hyperlipidemia, non-insulin-dependent diabetes, GERD, open angle glaucoma presenting to the emergency department for abdominal pain, nausea, 20 lb unintentional weight loss, increased reflux, cough.  She is well-appearing, no distress.  Neurovascularly intact.  She has diffuse  abdominal tenderness, worse in the left mid to lower quadrant.  No signs of peritonitis. Lung are clear. She was given symptomatic management. Labs, chest x-ray, CT abdomen/pelvis ordered. See ED course.     Social Determinants Limiting Care: None identified    EKG: See ED course.     Results: Independently reviewed and interpreted by me. Please see ED course and [facility] for my full interpretation.     Chronic Medical Conditions Significantly Affecting Care: as per [facility]    Patient was discussed with the following consultants/services: None     Care Considerations: as per [facility]    ED Course as of 07/13/25 2340   Sun Jul 13, 2025   1906 EKG per my interpretation reveals sinus rhythm, occasional PVCs, rate 86, normal axis, normal intervals, no ST elevation/depression or T wave abnormalities [LM]   1946 Labs reveal a slight lactate elevation, minimal hypomagnesemia.  She is receiving IV fluids. [LM]      ED Course User Index  [LM] Hilda Paul MD         Diagnoses as of 07/13/25 2340   Lactic acidosis   Gastritis without bleeding, unspecified chronicity, unspecified gastritis type   Weight loss       Disposition   Signed out to oncoming ED attending Dr. Washington pending results and reevaluation.       MD Hilda Castillo MD  07/13/25 2340

## 2025-07-14 ENCOUNTER — TELEPHONE (OUTPATIENT)
Dept: GASTROENTEROLOGY | Facility: HOSPITAL | Age: 80
End: 2025-07-14

## 2025-07-14 ENCOUNTER — PHARMACY VISIT (OUTPATIENT)
Dept: PHARMACY | Facility: CLINIC | Age: 80
End: 2025-07-14
Payer: COMMERCIAL

## 2025-07-14 VITALS
RESPIRATION RATE: 17 BRPM | BODY MASS INDEX: 26.84 KG/M2 | HEIGHT: 63 IN | WEIGHT: 151.46 LBS | DIASTOLIC BLOOD PRESSURE: 74 MMHG | TEMPERATURE: 96.6 F | HEART RATE: 76 BPM | OXYGEN SATURATION: 98 % | SYSTOLIC BLOOD PRESSURE: 130 MMHG

## 2025-07-14 PROBLEM — N32.89 DISTENDED BLADDER: Status: ACTIVE | Noted: 2025-07-14

## 2025-07-14 PROBLEM — K29.70 GASTRITIS: Status: ACTIVE | Noted: 2025-07-14

## 2025-07-14 PROBLEM — E87.20 LACTIC ACIDOSIS: Status: ACTIVE | Noted: 2025-07-14

## 2025-07-14 PROBLEM — R79.89 ELEVATED TROPONIN: Status: ACTIVE | Noted: 2025-07-14

## 2025-07-14 LAB
ATRIAL RATE: 86 BPM
CARDIAC TROPONIN I PNL SERPL HS: 26 NG/L (ref 0–13)
GLUCOSE BLD MANUAL STRIP-MCNC: 103 MG/DL (ref 74–99)
GLUCOSE BLD MANUAL STRIP-MCNC: 107 MG/DL (ref 74–99)
GLUCOSE BLD MANUAL STRIP-MCNC: 165 MG/DL (ref 74–99)
P AXIS: 66 DEGREES
P OFFSET: 175 MS
P ONSET: 123 MS
PR INTERVAL: 196 MS
Q ONSET: 221 MS
QRS COUNT: 14 BEATS
QRS DURATION: 72 MS
QT INTERVAL: 378 MS
QTC CALCULATION(BAZETT): 452 MS
QTC FREDERICIA: 426 MS
R AXIS: 47 DEGREES
T AXIS: 36 DEGREES
T OFFSET: 410 MS
VENTRICULAR RATE: 86 BPM

## 2025-07-14 PROCEDURE — 96366 THER/PROPH/DIAG IV INF ADDON: CPT | Mod: 59

## 2025-07-14 PROCEDURE — 2500000001 HC RX 250 WO HCPCS SELF ADMINISTERED DRUGS (ALT 637 FOR MEDICARE OP)

## 2025-07-14 PROCEDURE — 2500000004 HC RX 250 GENERAL PHARMACY W/ HCPCS (ALT 636 FOR OP/ED): Performed by: INTERNAL MEDICINE

## 2025-07-14 PROCEDURE — 2500000001 HC RX 250 WO HCPCS SELF ADMINISTERED DRUGS (ALT 637 FOR MEDICARE OP): Performed by: INTERNAL MEDICINE

## 2025-07-14 PROCEDURE — 96365 THER/PROPH/DIAG IV INF INIT: CPT | Mod: 59

## 2025-07-14 PROCEDURE — 99223 1ST HOSP IP/OBS HIGH 75: CPT | Performed by: INTERNAL MEDICINE

## 2025-07-14 PROCEDURE — 82947 ASSAY GLUCOSE BLOOD QUANT: CPT

## 2025-07-14 PROCEDURE — RXMED WILLOW AMBULATORY MEDICATION CHARGE

## 2025-07-14 PROCEDURE — 99238 HOSP IP/OBS DSCHRG MGMT 30/<: CPT

## 2025-07-14 PROCEDURE — 96372 THER/PROPH/DIAG INJ SC/IM: CPT | Performed by: INTERNAL MEDICINE

## 2025-07-14 PROCEDURE — G0378 HOSPITAL OBSERVATION PER HR: HCPCS

## 2025-07-14 PROCEDURE — 99221 1ST HOSP IP/OBS SF/LOW 40: CPT | Performed by: NURSE PRACTITIONER

## 2025-07-14 PROCEDURE — 2500000002 HC RX 250 W HCPCS SELF ADMINISTERED DRUGS (ALT 637 FOR MEDICARE OP, ALT 636 FOR OP/ED)

## 2025-07-14 PROCEDURE — 84484 ASSAY OF TROPONIN QUANT: CPT | Performed by: EMERGENCY MEDICINE

## 2025-07-14 PROCEDURE — 2500000004 HC RX 250 GENERAL PHARMACY W/ HCPCS (ALT 636 FOR OP/ED): Performed by: EMERGENCY MEDICINE

## 2025-07-14 PROCEDURE — 2500000002 HC RX 250 W HCPCS SELF ADMINISTERED DRUGS (ALT 637 FOR MEDICARE OP, ALT 636 FOR OP/ED): Performed by: INTERNAL MEDICINE

## 2025-07-14 PROCEDURE — 36415 COLL VENOUS BLD VENIPUNCTURE: CPT | Performed by: EMERGENCY MEDICINE

## 2025-07-14 PROCEDURE — 96374 THER/PROPH/DIAG INJ IV PUSH: CPT | Mod: 59

## 2025-07-14 RX ORDER — SODIUM CHLORIDE 9 MG/ML
50 INJECTION, SOLUTION INTRAVENOUS CONTINUOUS
Status: DISCONTINUED | OUTPATIENT
Start: 2025-07-14 | End: 2025-07-14 | Stop reason: HOSPADM

## 2025-07-14 RX ORDER — VIT C/E/ZN/COPPR/LUTEIN/ZEAXAN 250MG-90MG
250 CAPSULE ORAL DAILY
Status: DISCONTINUED | OUTPATIENT
Start: 2025-07-14 | End: 2025-07-14 | Stop reason: HOSPADM

## 2025-07-14 RX ORDER — ONDANSETRON HYDROCHLORIDE 2 MG/ML
4 INJECTION, SOLUTION INTRAVENOUS EVERY 8 HOURS PRN
Status: DISCONTINUED | OUTPATIENT
Start: 2025-07-14 | End: 2025-07-14 | Stop reason: HOSPADM

## 2025-07-14 RX ORDER — AMLODIPINE BESYLATE 5 MG/1
5 TABLET ORAL DAILY
Status: DISCONTINUED | OUTPATIENT
Start: 2025-07-14 | End: 2025-07-14 | Stop reason: HOSPADM

## 2025-07-14 RX ORDER — PANTOPRAZOLE SODIUM 40 MG/1
40 TABLET, DELAYED RELEASE ORAL 2 TIMES DAILY
Qty: 60 TABLET | Refills: 0 | Status: SHIPPED | OUTPATIENT
Start: 2025-07-15 | End: 2025-08-14

## 2025-07-14 RX ORDER — DORZOLAMIDE HYDROCHLORIDE AND TIMOLOL MALEATE 20; 5 MG/ML; MG/ML
1 SOLUTION/ DROPS OPHTHALMIC 2 TIMES DAILY
Status: DISCONTINUED | OUTPATIENT
Start: 2025-07-14 | End: 2025-07-14 | Stop reason: HOSPADM

## 2025-07-14 RX ORDER — ACETAMINOPHEN 650 MG/1
650 SUPPOSITORY RECTAL EVERY 4 HOURS PRN
Status: DISCONTINUED | OUTPATIENT
Start: 2025-07-14 | End: 2025-07-14 | Stop reason: HOSPADM

## 2025-07-14 RX ORDER — INSULIN LISPRO 100 [IU]/ML
0-5 INJECTION, SOLUTION INTRAVENOUS; SUBCUTANEOUS
Status: DISCONTINUED | OUTPATIENT
Start: 2025-07-14 | End: 2025-07-14 | Stop reason: HOSPADM

## 2025-07-14 RX ORDER — DORZOLAMIDE HYDROCHLORIDE AND TIMOLOL MALEATE 20; 5 MG/ML; MG/ML
1 SOLUTION/ DROPS OPHTHALMIC 2 TIMES DAILY
COMMUNITY
Start: 2025-06-11

## 2025-07-14 RX ORDER — ROSUVASTATIN CALCIUM 10 MG/1
5 TABLET, COATED ORAL 3 TIMES WEEKLY
Status: DISCONTINUED | OUTPATIENT
Start: 2025-07-14 | End: 2025-07-14 | Stop reason: HOSPADM

## 2025-07-14 RX ORDER — ACETAMINOPHEN 325 MG/1
650 TABLET ORAL EVERY 4 HOURS PRN
Status: DISCONTINUED | OUTPATIENT
Start: 2025-07-14 | End: 2025-07-14 | Stop reason: HOSPADM

## 2025-07-14 RX ORDER — POLYETHYLENE GLYCOL 3350 17 G/17G
17 POWDER, FOR SOLUTION ORAL DAILY PRN
Status: DISCONTINUED | OUTPATIENT
Start: 2025-07-14 | End: 2025-07-14 | Stop reason: HOSPADM

## 2025-07-14 RX ORDER — DEXTROSE 50 % IN WATER (D50W) INTRAVENOUS SYRINGE
25
Status: DISCONTINUED | OUTPATIENT
Start: 2025-07-14 | End: 2025-07-14 | Stop reason: HOSPADM

## 2025-07-14 RX ORDER — ESCITALOPRAM OXALATE 5 MG/1
5 TABLET ORAL
COMMUNITY
Start: 2025-06-03

## 2025-07-14 RX ORDER — TALC
3 POWDER (GRAM) TOPICAL NIGHTLY PRN
Status: DISCONTINUED | OUTPATIENT
Start: 2025-07-14 | End: 2025-07-14 | Stop reason: HOSPADM

## 2025-07-14 RX ORDER — ENOXAPARIN SODIUM 100 MG/ML
40 INJECTION SUBCUTANEOUS EVERY 24 HOURS
Status: DISCONTINUED | OUTPATIENT
Start: 2025-07-14 | End: 2025-07-14 | Stop reason: HOSPADM

## 2025-07-14 RX ORDER — LOSARTAN POTASSIUM 50 MG/1
100 TABLET ORAL DAILY
Status: DISCONTINUED | OUTPATIENT
Start: 2025-07-15 | End: 2025-07-14 | Stop reason: HOSPADM

## 2025-07-14 RX ORDER — ESCITALOPRAM OXALATE 10 MG/1
5 TABLET ORAL DAILY
Status: DISCONTINUED | OUTPATIENT
Start: 2025-07-14 | End: 2025-07-14 | Stop reason: HOSPADM

## 2025-07-14 RX ORDER — ONDANSETRON 4 MG/1
4 TABLET, FILM COATED ORAL EVERY 8 HOURS PRN
Status: DISCONTINUED | OUTPATIENT
Start: 2025-07-14 | End: 2025-07-14 | Stop reason: HOSPADM

## 2025-07-14 RX ORDER — PSYLLIUM HUSK 3.4 G/5.8G
POWDER ORAL DAILY
Qty: 30 PACKET | Refills: 0 | Status: SHIPPED | OUTPATIENT
Start: 2025-07-14

## 2025-07-14 RX ORDER — DEXTROSE 50 % IN WATER (D50W) INTRAVENOUS SYRINGE
12.5
Status: DISCONTINUED | OUTPATIENT
Start: 2025-07-14 | End: 2025-07-14 | Stop reason: HOSPADM

## 2025-07-14 RX ORDER — ACETAMINOPHEN 160 MG/5ML
650 SOLUTION ORAL EVERY 4 HOURS PRN
Status: DISCONTINUED | OUTPATIENT
Start: 2025-07-14 | End: 2025-07-14 | Stop reason: HOSPADM

## 2025-07-14 RX ADMIN — SODIUM CHLORIDE 50 ML/HR: 0.9 INJECTION, SOLUTION INTRAVENOUS at 05:21

## 2025-07-14 RX ADMIN — MAGNESIUM SULFATE HEPTAHYDRATE 2 G: 40 INJECTION, SOLUTION INTRAVENOUS at 00:11

## 2025-07-14 RX ADMIN — CYANOCOBALAMIN TAB 500 MCG 250 MCG: 500 TAB at 09:37

## 2025-07-14 RX ADMIN — PANTOPRAZOLE SODIUM 40 MG: 40 INJECTION, POWDER, FOR SOLUTION INTRAVENOUS at 09:36

## 2025-07-14 RX ADMIN — ROSUVASTATIN 5 MG: 10 TABLET, FILM COATED ORAL at 09:37

## 2025-07-14 RX ADMIN — ENOXAPARIN SODIUM 40 MG: 40 INJECTION SUBCUTANEOUS at 05:48

## 2025-07-14 RX ADMIN — POLYETHYLENE GLYCOL 3350 17 G: 17 POWDER, FOR SOLUTION ORAL at 15:02

## 2025-07-14 RX ADMIN — ACETAMINOPHEN 650 MG: 325 TABLET ORAL at 15:02

## 2025-07-14 RX ADMIN — INSULIN LISPRO 1 UNITS: 100 INJECTION, SOLUTION INTRAVENOUS; SUBCUTANEOUS at 16:35

## 2025-07-14 RX ADMIN — ONDANSETRON HYDROCHLORIDE 4 MG: 4 TABLET, FILM COATED ORAL at 15:02

## 2025-07-14 RX ADMIN — ESCITALOPRAM OXALATE 5 MG: 10 TABLET ORAL at 13:00

## 2025-07-14 SDOH — ECONOMIC STABILITY: INCOME INSECURITY: IN THE PAST 12 MONTHS HAS THE ELECTRIC, GAS, OIL, OR WATER COMPANY THREATENED TO SHUT OFF SERVICES IN YOUR HOME?: NO

## 2025-07-14 SDOH — SOCIAL STABILITY: SOCIAL INSECURITY: WERE YOU ABLE TO COMPLETE ALL THE BEHAVIORAL HEALTH SCREENINGS?: YES

## 2025-07-14 SDOH — SOCIAL STABILITY: SOCIAL INSECURITY: WITHIN THE LAST YEAR, HAVE YOU BEEN HUMILIATED OR EMOTIONALLY ABUSED IN OTHER WAYS BY YOUR PARTNER OR EX-PARTNER?: NO

## 2025-07-14 SDOH — ECONOMIC STABILITY: FOOD INSECURITY: WITHIN THE PAST 12 MONTHS, YOU WORRIED THAT YOUR FOOD WOULD RUN OUT BEFORE YOU GOT THE MONEY TO BUY MORE.: NEVER TRUE

## 2025-07-14 SDOH — SOCIAL STABILITY: SOCIAL INSECURITY: WITHIN THE LAST YEAR, HAVE YOU BEEN AFRAID OF YOUR PARTNER OR EX-PARTNER?: NO

## 2025-07-14 SDOH — SOCIAL STABILITY: SOCIAL INSECURITY
WITHIN THE LAST YEAR, HAVE YOU BEEN KICKED, HIT, SLAPPED, OR OTHERWISE PHYSICALLY HURT BY YOUR PARTNER OR EX-PARTNER?: NO

## 2025-07-14 SDOH — ECONOMIC STABILITY: FOOD INSECURITY: WITHIN THE PAST 12 MONTHS, THE FOOD YOU BOUGHT JUST DIDN'T LAST AND YOU DIDN'T HAVE MONEY TO GET MORE.: NEVER TRUE

## 2025-07-14 SDOH — SOCIAL STABILITY: SOCIAL INSECURITY: HAVE YOU HAD THOUGHTS OF HARMING ANYONE ELSE?: NO

## 2025-07-14 SDOH — SOCIAL STABILITY: SOCIAL INSECURITY
WITHIN THE LAST YEAR, HAVE YOU BEEN RAPED OR FORCED TO HAVE ANY KIND OF SEXUAL ACTIVITY BY YOUR PARTNER OR EX-PARTNER?: NO

## 2025-07-14 ASSESSMENT — ACTIVITIES OF DAILY LIVING (ADL)
LACK_OF_TRANSPORTATION: NO
TOILETING: INDEPENDENT
ADEQUATE_TO_COMPLETE_ADL: YES
HEARING - LEFT EAR: FUNCTIONAL
HEARING - RIGHT EAR: FUNCTIONAL
FEEDING YOURSELF: INDEPENDENT
JUDGMENT_ADEQUATE_SAFELY_COMPLETE_DAILY_ACTIVITIES: YES
LACK_OF_TRANSPORTATION: NO
GROOMING: INDEPENDENT
BATHING: INDEPENDENT
PATIENT'S MEMORY ADEQUATE TO SAFELY COMPLETE DAILY ACTIVITIES?: YES
WALKS IN HOME: INDEPENDENT
DRESSING YOURSELF: INDEPENDENT

## 2025-07-14 ASSESSMENT — LIFESTYLE VARIABLES
AUDIT-C TOTAL SCORE: 1
AUDIT-C TOTAL SCORE: 1
HOW OFTEN DO YOU HAVE 6 OR MORE DRINKS ON ONE OCCASION: NEVER
HOW MANY STANDARD DRINKS CONTAINING ALCOHOL DO YOU HAVE ON A TYPICAL DAY: 1 OR 2
HOW OFTEN DO YOU HAVE A DRINK CONTAINING ALCOHOL: MONTHLY OR LESS
SKIP TO QUESTIONS 9-10: 1

## 2025-07-14 ASSESSMENT — COGNITIVE AND FUNCTIONAL STATUS - GENERAL
MOBILITY SCORE: 24
PATIENT BASELINE BEDBOUND: NO
DAILY ACTIVITIY SCORE: 24

## 2025-07-14 ASSESSMENT — PATIENT HEALTH QUESTIONNAIRE - PHQ9
2. FEELING DOWN, DEPRESSED OR HOPELESS: NOT AT ALL
SUM OF ALL RESPONSES TO PHQ9 QUESTIONS 1 & 2: 0
1. LITTLE INTEREST OR PLEASURE IN DOING THINGS: NOT AT ALL

## 2025-07-14 ASSESSMENT — PAIN SCALES - GENERAL
PAINLEVEL_OUTOF10: 5 - MODERATE PAIN
PAINLEVEL_OUTOF10: 0 - NO PAIN

## 2025-07-14 ASSESSMENT — PAIN - FUNCTIONAL ASSESSMENT: PAIN_FUNCTIONAL_ASSESSMENT: 0-10

## 2025-07-14 ASSESSMENT — PAIN DESCRIPTION - LOCATION: LOCATION: ABDOMEN

## 2025-07-14 NOTE — NURSING NOTE
Discharge paperwork printed and gone over with patient with all questions answered. Called meds-to-bed and will be over shortly to deliver. Patient says daughter will be able to pick her up around 4pm.

## 2025-07-14 NOTE — CARE PLAN
The clinical goals for the shift include Pt will remain free from abd pain    Problem: Diabetes  Goal: Maintain glucose levels >70mg/dl to <250mg/dl throughout shift  Outcome: Progressing     Problem: Pain - Adult  Goal: Verbalizes/displays adequate comfort level or baseline comfort level  Outcome: Progressing     Problem: Fall/Injury  Goal: Not fall by end of shift  Outcome: Progressing  Goal: Be free from injury by end of the shift  Outcome: Progressing

## 2025-07-14 NOTE — H&P
Tele Admission    History Of Present Illness  Liv Hoskins is a 80 y.o. female with a past medical hypertension, hyperlipidemia, non-insulin-dependent diabetes mellitus type 2, GERD, open angle glaucoma comorbidities who was admitted to the hospital for abdominal pain, nausea, weight loss, elevated lactate and elevated troponin.  Patient states that her symptoms has been going on for more than 6 months.  She has been having intermittent abdominal pain.  She does not have an appetite.  When she eats she gets bloated and starts belching.  She has lost more about 30 pounds over the last 6 months.  She has acid reflux and now she has been coughing.  She had a colonoscopy last year when she turned 79 with Dr. Sutherland for Brina.  She also Dasia GEMA Chase-KATYA at the end of last year for similar symptoms.  She feels that her symptoms have been getting worse therefore she came to the hospital.  She states that she is lactose intolerant.  She denies having diarrhea.  She denies chest pain, palpitation, difficulty breathing, fever, chills, headache or dizziness.  She denies any leg swelling.    Past Medical History  She has a past medical history of Diabetes mellitus (Multi), HLD (hyperlipidemia), and Hypertension.    Surgical History  She has no past surgical history on file.     Social History  She reports that she has never smoked. She has never been exposed to tobacco smoke. She has never used smokeless tobacco. She reports that she does not currently use alcohol. She reports that she does not currently use drugs.  The patient lives alone.  She wants to be full code    Family History  Family History[1]     Allergies  Cherry, Shellfish derived, Strawberry, Apple, Bee venom protein (honey bee), Codeine, Ezetimibe, Lipitor [atorvastatin], Lisinopril, Macrolide antibiotics, Peanut, Penicillins, Statins-hmg-coa reductase inhibitors, Latex, and Mold    Medications  Scheduled medications  Scheduled Medications[2]  Continuous  medications  Continuous Medications[3]  PRN medications  PRN Medications[4]    Review of systems: 10-point review of systems is negative.     Physical Exam  Constitutional: alert and oriented x 3, awake, cooperative, no acute distress  Neuro: lert and oriented x 3,  Patient moves all limbs against gravity  Psychological: Appropriate mood and behavior     Last Recorded Vitals  /57 (BP Location: Right arm, Patient Position: Lying)   Pulse 60   Resp 18   Wt 68.7 kg (151 lb 7.3 oz)   SpO2 98%     Relevant Results  Results for orders placed or performed during the hospital encounter of 07/13/25 (from the past 24 hours)   CBC and Auto Differential   Result Value Ref Range    WBC 8.0 4.4 - 11.3 x10*3/uL    nRBC 0.0 0.0 - 0.0 /100 WBCs    RBC 4.60 4.00 - 5.20 x10*6/uL    Hemoglobin 14.4 12.0 - 16.0 g/dL    Hematocrit 44.2 36.0 - 46.0 %    MCV 96 80 - 100 fL    MCH 31.3 26.0 - 34.0 pg    MCHC 32.6 32.0 - 36.0 g/dL    RDW 12.8 11.5 - 14.5 %    Platelets 243 150 - 450 x10*3/uL    Neutrophils % 65.2 40.0 - 80.0 %    Immature Granulocytes %, Automated 0.3 0.0 - 0.9 %    Lymphocytes % 26.0 13.0 - 44.0 %    Monocytes % 6.1 2.0 - 10.0 %    Eosinophils % 1.6 0.0 - 6.0 %    Basophils % 0.8 0.0 - 2.0 %    Neutrophils Absolute 5.20 1.60 - 5.50 x10*3/uL    Immature Granulocytes Absolute, Automated 0.02 0.00 - 0.50 x10*3/uL    Lymphocytes Absolute 2.07 0.80 - 3.00 x10*3/uL    Monocytes Absolute 0.49 0.05 - 0.80 x10*3/uL    Eosinophils Absolute 0.13 0.00 - 0.40 x10*3/uL    Basophils Absolute 0.06 0.00 - 0.10 x10*3/uL   Magnesium   Result Value Ref Range    Magnesium 1.54 (L) 1.60 - 2.40 mg/dL   Comprehensive metabolic panel   Result Value Ref Range    Glucose 301 (H) 74 - 99 mg/dL    Sodium 136 136 - 145 mmol/L    Potassium 4.1 3.5 - 5.3 mmol/L    Chloride 99 98 - 107 mmol/L    Bicarbonate 26 21 - 32 mmol/L    Anion Gap 15 10 - 20 mmol/L    Urea Nitrogen 9 6 - 23 mg/dL    Creatinine 1.04 0.50 - 1.05 mg/dL    eGFR 54 (L) >60  mL/min/1.73m*2    Calcium 10.2 8.6 - 10.3 mg/dL    Albumin 4.0 3.4 - 5.0 g/dL    Alkaline Phosphatase 49 33 - 136 U/L    Total Protein 7.3 6.4 - 8.2 g/dL    AST 22 9 - 39 U/L    Bilirubin, Total 1.4 (H) 0.0 - 1.2 mg/dL    ALT 12 7 - 45 U/L   Lipase   Result Value Ref Range    Lipase 20 9 - 82 U/L   Lactate   Result Value Ref Range    Lactate 3.1 (H) 0.4 - 2.0 mmol/L   Troponin I, High Sensitivity, Initial   Result Value Ref Range    Troponin I, High Sensitivity 12 0 - 13 ng/L   TSH with reflex to Free T4 if abnormal   Result Value Ref Range    Thyroid Stimulating Hormone 0.48 0.44 - 3.98 mIU/L   Troponin, High Sensitivity, 1 Hour   Result Value Ref Range    Troponin I, High Sensitivity 22 (H) 0 - 13 ng/L   Lactate   Result Value Ref Range    Lactate 3.0 (H) 0.4 - 2.0 mmol/L   Troponin I, High Sensitivity   Result Value Ref Range    Troponin I, High Sensitivity 31 (H) 0 - 13 ng/L   Lactate   Result Value Ref Range    Lactate 1.4 0.4 - 2.0 mmol/L   Troponin I, High Sensitivity   Result Value Ref Range    Troponin I, High Sensitivity 26 (H) 0 - 13 ng/L     Imaging  CT abdomen pelvis w IV contrast  Result Date: 7/13/2025  Questionable distal gastric wall thickening. Nonspecific gastritis or neoplasm not excluded. Consider further evaluation with endoscopy.   Mild bilateral proximal renal collecting system fullness with distended bladder. Correlate with possible urinary retention/bladder outlet obstruction.   Small fat containing bilateral inguinal region hernias.   Other findings as described above.   MACRO: None.   Signed by: Amna Spann 7/13/2025 8:50 PM Dictation workstation:   [id number]    XR chest 2 views  Result Date: 7/13/2025  1.  No evidence of acute cardiopulmonary process.       MACRO: None   Signed by: Claudy Manning 7/13/2025 8:12 PM Dictation workstation:   [id number]      Cardiology, Vascular, and Other Imaging  No other imaging results found for the past 7 days       Assessment/Plan   Assessment &  Plan  Lactic acidosis    Elevated troponin    Gastritis    Distended bladder    Liv Hoskins is a 80 y.o. female with a past medical hypertension, hyperlipidemia, non-insulin-dependent diabetes mellitus type 2, GERD, open angle glaucoma comorbidities who was admitted to the hospital for abdominal pain, nausea, weight loss, elevated lactate and elevated troponin.      Gastritis  - Unclear if this is acute versus acute on chronic  - Patient has been having GI issue for a while  - Last colonoscopy was done last year  - The CT scan was showing distal wall thickening.  Neoplasm cannot be completely excluded  -Patient has been losing weight  - Consult GI  - Continue PPI and cholestyramine    Elevated lactate  - Likely due to metformin and dehydration.  - Lactate has normalized  - Continue IV fluids    Elevated troponin  - Likely due to demand ischemia  - EKG shows sinus rhythm with occasional PVCs but otherwise normal  - The peak troponin was 31.  It is now down to 26  - Will continue supportive care  - Follow-up with cardiology    Distended bladder on CT scan  - Follow-up urinalysis    Hypertension  - Resume Norvasc    Hyperlipidemia-resume Crestor    Diabetes mellitus type 2  - Hold metformin  - Give sliding scale insulin  - Will monitor      Will resume home meds for the other comorbidities  CODE STATUS: Full code         Leandra Frye MD         [1] No family history on file.  [2] enoxaparin, 40 mg, subcutaneous, q24h  insulin lispro, 0-5 Units, subcutaneous, TID AC  pantoprazole, 40 mg, intravenous, Daily  [3] sodium chloride 0.9%, 50 mL/hr, Last Rate: 50 mL/hr (07/14/25 0521)  [4] PRN medications: acetaminophen **OR** acetaminophen **OR** acetaminophen, dextrose, dextrose, glucagon, glucagon, melatonin, ondansetron **OR** ondansetron, polyethylene glycol

## 2025-07-14 NOTE — PROGRESS NOTES
Emergency Medicine Transition of Care Note.    I received Liv Hoskins in signout from Dr. Paul.  Please see the previous ED provider note for all HPI, PE and MDM up to the time of signout. This is in addition to the primary record.    In brief Liv Hoskins is an 80 y.o. female presenting for   Chief Complaint   Patient presents with    Abdominal Pain     At the time of signout we were awaiting: CT scan    ED Course as of 07/14/25 0125   Sun Jul 13, 2025 1906 EKG per my interpretation reveals sinus rhythm, occasional PVCs, rate 86, normal axis, normal intervals, no ST elevation/depression or T wave abnormalities [LM]   1946 Labs reveal a slight lactate elevation, minimal hypomagnesemia.  She is receiving IV fluids. [LM]      ED Course User Index  [LM] Hilda Paul MD         Diagnoses as of 07/14/25 0125   Lactic acidosis   Gastritis without bleeding, unspecified chronicity, unspecified gastritis type   Weight loss       Medical Decision Making  The patient did have some left upper quadrant and epigastric discomfort that she thought was her worsening reflux.  She does have gastritis on CT scan.  Malignancy cannot be excluded.  May need an outpatient endoscopy.  I did inform the patient of this.  Patient also has an increase in troponin while not a significant increase given her age we will admit for cardiac workup as well.  Patient was given Protonix.  The patient continues to have an elevated lactic acid without any signs of infection.  She is on metformin.  Consider this as a possible cause.    Final diagnoses:   [E87.20] Lactic acidosis   [K29.70] Gastritis without bleeding, unspecified chronicity, unspecified gastritis type   [R63.4] Weight loss           Procedure  Procedures    Koby Washington MD

## 2025-07-14 NOTE — CONSULTS
Reason For Consult  gastritis    History Of Present Illness  Liv Hoskins is a 80 y.o. female with h/o HTN, HLD, NIDDM, GERD, open angle glaucoma presented with c/o abdominal pain, nausea, 20 lb unintentional weight loss, and diarrhea.  Stated her symptoms started last year in October, she reports postprandial abdominal bloating, distention, increased acid reflux, some nausea without vomiting.  She also reports 3-4 episodes of nonbloody diarrhea.  She had normal colonoscopy in 10/24/2024, see reports below.  She saw GI nurse practitioner in October of last year, was prescribed pantoprazole and cholestyramine 9 for diarrhea.  Stated she improved initially, but now stated the symptoms are getting worse.  She stated she is lactose intolerant, removed all possible lactose from her diet and it helps some.  She tolerated diet last night and this morning without additional symptoms.  She was started on pantoprazole here.  She denies dysphagia or odynophagia.  She takes ibuprofen as needed.  She has been on metformin since 2010.  Stated your blood sugar is well-controlled, however she was hyperglycemic on admission.  Denies history of gastroparesis in the past.  CT on admission showed possible gastric wall thickening.     Colonoscopy 6/24/24   - Granularity at the ileocecal valve. Biopsied.   - Internal hemorrhoids.   Biopsy:  A.  Colon, ileocecal valve, granular mucosa, biopsy:  - Colonic mucosa with no diagnostic abnormality.  - Two deeper levels examined.     Past Medical History  She has a past medical history of Diabetes mellitus (Multi), HLD (hyperlipidemia), and Hypertension.    Surgical History  She has no past surgical history on file.     Social History  She reports that she has never smoked. She has never been exposed to tobacco smoke. She has never used smokeless tobacco. She reports that she does not currently use alcohol. She reports that she does not currently use drugs.    Family History  Family  "History[1]     Allergies  Cherry, Shellfish derived, Strawberry, Apple, Bee venom protein (honey bee), Codeine, Ezetimibe, Lipitor [atorvastatin], Lisinopril, Macrolide antibiotics, Peanut, Penicillins, Statins-hmg-coa reductase inhibitors, Latex, and Mold    Review of Systems  10 systems reviewed and negative other than HPI     Physical Exam  Physical Exam  Vitals reviewed.   Constitutional:       Appearance: Normal appearance.   HENT:      Head: Normocephalic and atraumatic.      Nose: Nose normal.      Mouth/Throat:      Mouth: Mucous membranes are moist.      Pharynx: Oropharynx is clear.   Eyes:      Conjunctiva/sclera: Conjunctivae normal.   Cardiovascular:      Rate and Rhythm: Normal rate and regular rhythm.      Heart sounds: Normal heart sounds.   Pulmonary:      Effort: Pulmonary effort is normal.      Breath sounds: Normal breath sounds.   Abdominal:      General: Bowel sounds are normal. There is no distension.      Palpations: Abdomen is soft.      Tenderness: There is no abdominal tenderness. There is no guarding.   Skin:     General: Skin is warm and dry.   Neurological:      General: No focal deficit present.      Mental Status: She is alert and oriented to person, place, and time.   Psychiatric:         Mood and Affect: Mood normal.         Behavior: Behavior normal.            Last Recorded Vitals  Blood pressure 132/57, pulse 60, resp. rate 18, height 1.6 m (5' 3\"), weight 68.7 kg (151 lb 7.3 oz), SpO2 98%.    Relevant Results      Scheduled medications  Scheduled Medications[2]  Continuous medications  Continuous Medications[3]  PRN medications  PRN Medications[4]  ECG 12 lead  Result Date: 7/14/2025  Sinus rhythm with occasional Premature ventricular complexes Otherwise normal ECG When compared with ECG of 08-JUN-2025 18:40, Premature ventricular complexes are now Present    CT abdomen pelvis w IV contrast  Result Date: 7/13/2025    Questionable distal gastric wall thickening. Nonspecific " gastritis or neoplasm not excluded. Consider further evaluation with endoscopy.   Mild bilateral proximal renal collecting system fullness with distended bladder. Correlate with possible urinary retention/bladder outlet obstruction.   Small fat containing bilateral inguinal region hernias.   Other findings as described above.   MACRO: None.   Signed by: Amna Spann 7/13/2025 8:50 PM Dictation workstation:   [id number]    XR chest 2 views  Result Date: 7/13/2025  1.  No evidence of acute cardiopulmonary process.       MACRO: None   Signed by: Claudy Manning 7/13/2025 8:12 PM Dictation workstation:   [id number]    Results for orders placed or performed during the hospital encounter of 07/13/25 (from the past 24 hours)   CBC and Auto Differential   Result Value Ref Range    WBC 8.0 4.4 - 11.3 x10*3/uL    nRBC 0.0 0.0 - 0.0 /100 WBCs    RBC 4.60 4.00 - 5.20 x10*6/uL    Hemoglobin 14.4 12.0 - 16.0 g/dL    Hematocrit 44.2 36.0 - 46.0 %    MCV 96 80 - 100 fL    MCH 31.3 26.0 - 34.0 pg    MCHC 32.6 32.0 - 36.0 g/dL    RDW 12.8 11.5 - 14.5 %    Platelets 243 150 - 450 x10*3/uL    Neutrophils % 65.2 40.0 - 80.0 %    Immature Granulocytes %, Automated 0.3 0.0 - 0.9 %    Lymphocytes % 26.0 13.0 - 44.0 %    Monocytes % 6.1 2.0 - 10.0 %    Eosinophils % 1.6 0.0 - 6.0 %    Basophils % 0.8 0.0 - 2.0 %    Neutrophils Absolute 5.20 1.60 - 5.50 x10*3/uL    Immature Granulocytes Absolute, Automated 0.02 0.00 - 0.50 x10*3/uL    Lymphocytes Absolute 2.07 0.80 - 3.00 x10*3/uL    Monocytes Absolute 0.49 0.05 - 0.80 x10*3/uL    Eosinophils Absolute 0.13 0.00 - 0.40 x10*3/uL    Basophils Absolute 0.06 0.00 - 0.10 x10*3/uL   Magnesium   Result Value Ref Range    Magnesium 1.54 (L) 1.60 - 2.40 mg/dL   Comprehensive metabolic panel   Result Value Ref Range    Glucose 301 (H) 74 - 99 mg/dL    Sodium 136 136 - 145 mmol/L    Potassium 4.1 3.5 - 5.3 mmol/L    Chloride 99 98 - 107 mmol/L    Bicarbonate 26 21 - 32 mmol/L    Anion Gap 15 10 - 20  mmol/L    Urea Nitrogen 9 6 - 23 mg/dL    Creatinine 1.04 0.50 - 1.05 mg/dL    eGFR 54 (L) >60 mL/min/1.73m*2    Calcium 10.2 8.6 - 10.3 mg/dL    Albumin 4.0 3.4 - 5.0 g/dL    Alkaline Phosphatase 49 33 - 136 U/L    Total Protein 7.3 6.4 - 8.2 g/dL    AST 22 9 - 39 U/L    Bilirubin, Total 1.4 (H) 0.0 - 1.2 mg/dL    ALT 12 7 - 45 U/L   Lipase   Result Value Ref Range    Lipase 20 9 - 82 U/L   Lactate   Result Value Ref Range    Lactate 3.1 (H) 0.4 - 2.0 mmol/L   Troponin I, High Sensitivity, Initial   Result Value Ref Range    Troponin I, High Sensitivity 12 0 - 13 ng/L   TSH with reflex to Free T4 if abnormal   Result Value Ref Range    Thyroid Stimulating Hormone 0.48 0.44 - 3.98 mIU/L   ECG 12 lead   Result Value Ref Range    Ventricular Rate 86 BPM    Atrial Rate 86 BPM    RI Interval 196 ms    QRS Duration 72 ms    QT Interval 378 ms    QTC Calculation(Bazett) 452 ms    P Axis 66 degrees    R Axis 47 degrees    T Axis 36 degrees    QRS Count 14 beats    Q Onset 221 ms    P Onset 123 ms    P Offset 175 ms    T Offset 410 ms    QTC Fredericia 426 ms   Troponin, High Sensitivity, 1 Hour   Result Value Ref Range    Troponin I, High Sensitivity 22 (H) 0 - 13 ng/L   Lactate   Result Value Ref Range    Lactate 3.0 (H) 0.4 - 2.0 mmol/L   Troponin I, High Sensitivity   Result Value Ref Range    Troponin I, High Sensitivity 31 (H) 0 - 13 ng/L   Lactate   Result Value Ref Range    Lactate 1.4 0.4 - 2.0 mmol/L   Troponin I, High Sensitivity   Result Value Ref Range    Troponin I, High Sensitivity 26 (H) 0 - 13 ng/L   POCT GLUCOSE   Result Value Ref Range    POCT Glucose 103 (H) 74 - 99 mg/dL          Assessment/Plan     80 y.o. female with h/o HTN, HLD, NIDDM, GERD, open angle glaucoma presented with c/o abdominal pain, postprandial bloating, nausea, 20 lb unintentional weight loss, and diarrhea since 10/2024.  Suspect gastritis, GERD, other differentials include PUD, constipation with overflow diarrhea, SIBO, occult GI  malignancy    - Recommend to continue twice daily PPI  - Consider Metamucil once or twice daily instead of Questran  - Lactose-free diet  - Recommend EGD, will try to add on as outpatient this week  - Outpatient follow-up with GI  - Okay to discharge from GI standpoint        CHINYERE Buckley-CNP         [1] No family history on file.  [2] amLODIPine, 5 mg, oral, Daily  cholestyramine, 2 g, oral, Daily  cyanocobalamin, 250 mcg, oral, Daily  dorzolamide-timoloL, 1 drop, ophthalmic (eye), BID  enoxaparin, 40 mg, subcutaneous, q24h  escitalopram, 5 mg, oral, Daily  insulin lispro, 0-5 Units, subcutaneous, TID AC  [START ON 7/15/2025] losartan, 100 mg, oral, Daily  pantoprazole, 40 mg, intravenous, Daily  rosuvastatin, 5 mg, oral, Once per day on Monday Wednesday Friday  [3] sodium chloride 0.9%, 50 mL/hr, Last Rate: 50 mL/hr (07/14/25 0521)  [4] PRN medications: acetaminophen **OR** acetaminophen **OR** acetaminophen, dextrose, dextrose, glucagon, glucagon, melatonin, ondansetron **OR** ondansetron, polyethylene glycol

## 2025-07-14 NOTE — PROGRESS NOTES
Pharmacy Medication History Review    Liv Hoskins is a 80 y.o. female admitted for Lactic acidosis. Pharmacy reviewed the patient's [id number] medications and allergies for accuracy.      Below are additional concerns with the patient's PTA list.  Collected from patient.      The list below reflectives the updated PTA list. Please review each medication in order reconciliation for additional clarification and justification.    Prior to Admission Medications   Prescriptions   amLODIPine (Norvasc) 5 mg tablet   Sig: Take 1 tablet (5 mg) by mouth once daily.   cetirizine (ZyrTEC) 10 mg tablet   Sig: Take 1 tablet (10 mg) by mouth once daily.   cholestyramine (Questran) 4 gram powder   Sig: Take 0.5 packets (2 g) by mouth once daily.   cyanocobalamin (Vitamin B-12) 250 mcg tablet   Sig: Take 1 tablet (250 mcg) by mouth once daily.   dorzolamide-timoloL (Cosopt) 22.3-6.8 mg/mL ophthalmic solution   Sig: Administer 1 drop into affected eye(s) twice a day.   escitalopram (Lexapro) 5 mg tablet   Sig: Take 1 tablet (5 mg) by mouth once daily.   hydrOXYzine HCL (Atarax) 25 mg tablet   Sig: Take 1 tablet (25 mg) by mouth every 8 hours if needed for anxiety for up to 4 days.   hydrocortisone butyrate 0.1 % lotion   Sig: Apply 1 Application topically 2 times a day.   latanoprost (Xalatan) 0.005 % ophthalmic solution   Sig: Administer 1 drop into both eyes once daily at bedtime.   losartan (Cozaar) 100 mg tablet   Sig: Take 1 tablet (100 mg) by mouth once daily.   metFORMIN (Glucophage) 500 mg tablet   Sig: Take 1 tablet (500 mg) by mouth 2 times daily (morning and late afternoon).   multivitamin tablet   Sig: Take 1 tablet by mouth once daily.   pantoprazole (ProtoNix) 40 mg EC tablet   Sig: Take 1 tablet (40 mg) by mouth once daily in the morning. Take before meals. Do not crush, chew, or split.   rosuvastatin (Crestor) 5 mg tablet   Sig: Take 1 tablet (5 mg) by mouth 3 (three) times a week.       Facility-Administered Medications: None            Marlene Rodriguez, PharmD

## 2025-07-14 NOTE — TELEPHONE ENCOUNTER
Called to schedule patient with Dr. Bach on Wednesday July 16th at 7:30 am. No answer, left voicemail with scheduling details.

## 2025-07-14 NOTE — PROGRESS NOTES
07/14/25 1122   Discharge Planning   Living Arrangements Alone   Support Systems Family members;Children   Assistance Needed None   Type of Residence Private residence   Number of Stairs to Enter Residence 0   Number of Stairs Within Residence 0   Do you have animals or pets at home? No   Home or Post Acute Services None   Expected Discharge Disposition Home   Does the patient need discharge transport arranged? Yes   [facility] [employer] coordination needed? Yes   Financial Resource Strain   How hard is it for you to pay for the very basics like food, housing, medical care, and heating? Not hard   Housing Stability   In the last 12 months, was there a time when you were not able to pay the mortgage or rent on time? N   At any time in the past 12 months, were you homeless or living in a shelter (including now)? N   Transportation Needs   In the past 12 months, has lack of transportation kept you from medical appointments or from getting medications? no   In the past 12 months, has lack of transportation kept you from meetings, work, or from getting things needed for daily living? No     Met with patient at bedside to  discuss her preferences for care upon discharge. Discussed how patient manages health at home. Lives alone in an apartment .  Independent in all ADLs.  No home O2, dialysis, or assistive devices.  Patient drives and is able to get herself to appointments and  medications.  Patient to have EGD as outpatient--she was hoping she could have it while her family from [place] is in town.  No additional resources or needs identified. Reviewed today's plan of care.  Patient verbalized understanding as evidenced by teach back method. Patient plans to return home at discharge & follow up with her PCP.  Family will provide transportation home upon discharge.  Anticipate discharge today or tomorrow.  VIJAY Salinas RN [facility]

## 2025-07-15 ENCOUNTER — TELEPHONE (OUTPATIENT)
Dept: GASTROENTEROLOGY | Facility: HOSPITAL | Age: 80
End: 2025-07-15
Payer: MEDICARE

## 2025-07-16 NOTE — DISCHARGE SUMMARY
Discharge Diagnosis  Lactic acidosis    Issues Requiring Follow-Up  PCP, GI    Discharge Meds     Medication List      START taking these medications     MetamuciL Fiber (aspartame) 3.4 gram packet; Generic drug: psyllium; Mix   1 packet in liquid then take by mouth once daily as directed     CHANGE how you take these medications     pantoprazole 40 mg EC tablet; Commonly known as: ProtoNix; Take 1 tablet   (40 mg) by mouth 2 times a day. Do not crush, chew, or split. Do not fill   before July 15, 2025.; What changed: when to take this     CONTINUE taking these medications     amLODIPine 5 mg tablet; Commonly known as: Norvasc   cetirizine 10 mg tablet; Commonly known as: ZyrTEC   cyanocobalamin 250 mcg tablet; Commonly known as: Vitamin B-12   dorzolamide-timoloL 22.3-6.8 mg/mL ophthalmic solution; Commonly known   as: Cosopt   EPINEPHrine 0.3 mg/0.3 mL injection syringe; Commonly known as: Epipen;   Inject 0.3 mL (0.3 mg) into the muscle 1 time if needed for anaphylaxis   for up to 2 doses. Inject into upper leg. Call 911 after use.   escitalopram 5 mg tablet; Commonly known as: Lexapro   hydrocortisone butyrate 0.1 % lotion; Apply 1 Application topically 2   times a day.   latanoprost 0.005 % ophthalmic solution; Commonly known as: Xalatan   losartan 100 mg tablet; Commonly known as: Cozaar   metFORMIN 500 mg tablet; Commonly known as: Glucophage; Take 1 tablet   (500 mg) by mouth 2 times daily (morning and late afternoon).   multivitamin tablet   rosuvastatin 5 mg tablet; Commonly known as: Crestor     STOP taking these medications     cholestyramine 4 gram powder; Commonly known as: Questran   famotidine 20 mg tablet; Commonly known as: Pepcid   hydrOXYzine HCL 25 mg tablet; Commonly known as: Atarax   LORazepam 0.5 mg tablet; Commonly known as: Ativan       Test Results Pending At Discharge  Pending Labs       No current pending labs.            Hospital Course  Liv Hoskins is a 80 y.o. female with a past  medical hypertension, hyperlipidemia, non-insulin-dependent diabetes mellitus type 2, GERD, open angle glaucoma comorbidities who was admitted to the hospital for abdominal pain, nausea, weight loss, elevated lactate and elevated troponin.  Patient states that her symptoms has been going on for more than 6 months.  She has been having intermittent abdominal pain.  She does not have an appetite.  When she eats she gets bloated and starts belching.  She has lost more about 30 pounds over the last 6 months.  She has acid reflux and now she has been coughing.  She had a colonoscopy last year when she turned 79 with Dr. Sutherland for Brina.  She also Dasia GEMA Chase-KATYA at the end of last year for similar symptoms.  She feels that her symptoms have been getting worse therefore she came to the hospital.  She states that she is lactose intolerant.  She denies having diarrhea.  She denies chest pain, palpitation, difficulty breathing, fever, chills, headache or dizziness.  She denies any leg swelling.     Gastritis  - Unclear if this is acute versus acute on chronic  - Patient has been having GI issue for a while  - Last colonoscopy was done last year  - The CT scan was showing distal wall thickening.  Neoplasm cannot be completely excluded  -Patient has been losing weight  - GI saw patient while admitted recommended op EGD, PPI BID, changing Questran to Metamucil, and lactose free diet     Elevated lactate  - Likely due to metformin and dehydration.  - Lactate has normalized  - Continue IV fluids     Elevated troponin  - Likely due to demand ischemia  - EKG shows sinus rhythm with occasional PVCs but otherwise normal  - The peak troponin was 31.  It is now down to 26  - Will continue supportive care     Pertinent Physical Exam At Time of Discharge  Physical Exam  Constitutional:       Appearance: Normal appearance.   Cardiovascular:      Rate and Rhythm: Normal rate and regular rhythm.   Pulmonary:      Effort: Pulmonary  effort is normal.      Breath sounds: Normal breath sounds.   Abdominal:      General: Abdomen is flat.      Palpations: Abdomen is soft.      Tenderness: There is no abdominal tenderness.   Neurological:      Mental Status: She is alert.         Outpatient Follow-Up  Future Appointments   Date Time Provider Department Center   7/25/2025  8:30 AM Hadley Lawrence MD [id number]     Patient stable for discharge. Vitals and labs reviewed. Both plan and discharge discussed with Dr. Snider and the interdispclinary team.    Irene Cronin PA-C

## 2025-07-18 ENCOUNTER — TELEPHONE (OUTPATIENT)
Dept: GASTROENTEROLOGY | Facility: HOSPITAL | Age: 80
End: 2025-07-18
Payer: MEDICARE

## 2025-07-18 NOTE — TELEPHONE ENCOUNTER
Efficiency Tip    Would you like to sign encounters with a single click by skipping recommended issues?  Choose your behavior for the Sign Encounter button - Dismiss     To Close This Visit    Recommended    No notes were written in this encounter. If this encounter was created in error, update the Reason for Visit to Error. This will convert the encounter to an Erroneous Encounter at the end of the day.            Questionnaires    No completed forms available for this encounter.

## 2025-07-25 ENCOUNTER — HOSPITAL ENCOUNTER (OUTPATIENT)
Dept: GASTROENTEROLOGY | Facility: HOSPITAL | Age: 80
Discharge: HOME | End: 2025-07-25
Payer: MEDICARE

## 2025-07-25 VITALS
TEMPERATURE: 96.8 F | BODY MASS INDEX: 25.8 KG/M2 | HEIGHT: 63 IN | OXYGEN SATURATION: 98 % | WEIGHT: 145.6 LBS | DIASTOLIC BLOOD PRESSURE: 59 MMHG | RESPIRATION RATE: 17 BRPM | HEART RATE: 53 BPM | SYSTOLIC BLOOD PRESSURE: 127 MMHG

## 2025-07-25 DIAGNOSIS — K29.70 GASTRITIS WITHOUT BLEEDING, UNSPECIFIED CHRONICITY, UNSPECIFIED GASTRITIS TYPE: ICD-10-CM

## 2025-07-25 LAB — GLUCOSE BLD MANUAL STRIP-MCNC: 122 MG/DL (ref 74–99)

## 2025-07-25 PROCEDURE — 82947 ASSAY GLUCOSE BLOOD QUANT: CPT

## 2025-07-25 PROCEDURE — 43239 EGD BIOPSY SINGLE/MULTIPLE: CPT | Performed by: INTERNAL MEDICINE

## 2025-07-25 PROCEDURE — 7100000010 HC PHASE TWO TIME - EACH INCREMENTAL 1 MINUTE

## 2025-07-25 PROCEDURE — 3700000012 HC SEDATION LEVEL 5+ TIME - INITIAL 15 MINUTES 5/> YEARS

## 2025-07-25 PROCEDURE — 2500000004 HC RX 250 GENERAL PHARMACY W/ HCPCS (ALT 636 FOR OP/ED): Performed by: INTERNAL MEDICINE

## 2025-07-25 PROCEDURE — 7100000009 HC PHASE TWO TIME - INITIAL BASE CHARGE

## 2025-07-25 RX ORDER — FENTANYL CITRATE 50 UG/ML
INJECTION, SOLUTION INTRAMUSCULAR; INTRAVENOUS AS NEEDED
Status: COMPLETED | OUTPATIENT
Start: 2025-07-25 | End: 2025-07-25

## 2025-07-25 RX ORDER — MIDAZOLAM HYDROCHLORIDE 1 MG/ML
INJECTION, SOLUTION INTRAMUSCULAR; INTRAVENOUS AS NEEDED
Status: COMPLETED | OUTPATIENT
Start: 2025-07-25 | End: 2025-07-25

## 2025-07-25 RX ADMIN — MIDAZOLAM 1 MG: 1 INJECTION INTRAMUSCULAR; INTRAVENOUS at 08:59

## 2025-07-25 RX ADMIN — FENTANYL CITRATE 25 MCG: 50 INJECTION, SOLUTION INTRAMUSCULAR; INTRAVENOUS at 08:56

## 2025-07-25 RX ADMIN — FENTANYL CITRATE 25 MCG: 50 INJECTION, SOLUTION INTRAMUSCULAR; INTRAVENOUS at 08:51

## 2025-07-25 RX ADMIN — FENTANYL CITRATE 25 MCG: 50 INJECTION, SOLUTION INTRAMUSCULAR; INTRAVENOUS at 08:47

## 2025-07-25 RX ADMIN — MIDAZOLAM 1 MG: 1 INJECTION INTRAMUSCULAR; INTRAVENOUS at 08:54

## 2025-07-25 RX ADMIN — MIDAZOLAM 1 MG: 1 INJECTION INTRAMUSCULAR; INTRAVENOUS at 08:51

## 2025-07-25 RX ADMIN — MIDAZOLAM 1 MG: 1 INJECTION INTRAMUSCULAR; INTRAVENOUS at 08:47

## 2025-07-25 ASSESSMENT — PAIN SCALES - GENERAL
PAINLEVEL_OUTOF10: 0 - NO PAIN

## 2025-07-25 ASSESSMENT — PAIN - FUNCTIONAL ASSESSMENT
PAIN_FUNCTIONAL_ASSESSMENT: 0-10

## 2025-07-25 NOTE — DISCHARGE INSTRUCTIONS

## 2025-07-25 NOTE — H&P
History Of Present Illness  Liv Hoskins is a 80 y.o. female with abdominal pain, postprandial bloating, nausea and unintentional weight loss who presents for EGD evaluation. CT a/p had shown distal gastric thickening.      Past Medical History  Medical History[1]  Surgical History  Surgical History[2]  Social History  She reports that she has never smoked. She has never been exposed to tobacco smoke. She has never used smokeless tobacco. She reports that she does not currently use alcohol. She reports that she does not currently use drugs.    Family History  Family History[3]     Allergies  Allergies[4]  Review of Systems   All other systems reviewed and are negative.    Pre-sedation Evaluation:  ASA Classification - ASA 2 - Patient with mild systemic disease with no functional limitations  Mallampati Score - II (hard and soft palate, upper portion of tonsils and uvula visible)    Physical Exam  Vitals reviewed.     Eyes:      General: No scleral icterus.      Cardiovascular:      Rate and Rhythm: Normal rate and regular rhythm.      Pulses: Normal pulses.   Pulmonary:      Effort: Pulmonary effort is normal.   Abdominal:      General: Abdomen is flat. There is no distension.      Tenderness: There is no abdominal tenderness.     Neurological:      General: No focal deficit present.      Mental Status: She is alert and oriented to person, place, and time.     Psychiatric:         Mood and Affect: Mood normal.          Last Recorded Vitals  There were no vitals taken for this visit.     Assessment/Plan   Will proceed with EGD      PTA/Current Medications:  Prescriptions Prior to Admission[5]  Current Medications[6]  Tj Dominique MD         [1]   Past Medical History:  Diagnosis Date    Diabetes mellitus (Multi)     HLD (hyperlipidemia)     Hypertension    [2] No past surgical history on file.  [3] No family history on file.  [4]   Allergies  Allergen Reactions    Cherry Anaphylaxis    Shellfish Derived  Anaphylaxis and Unknown    Strawberry Hives and Anaphylaxis    Apple Hives    Bee Venom Protein (Honey Bee) Swelling    Codeine Hallucinations and Psychosis    Ezetimibe Headache and Unknown     Headache    Lipitor [Atorvastatin] Myalgia    Lisinopril Angioedema     cough    Macrolide Antibiotics Swelling    Peanut Other    Penicillins Swelling    Statins-Hmg-Coa Reductase Inhibitors Myalgia and Other     Muscle aches    Latex Hives, Swelling and Rash    Mold Rash and Unknown   [5] (Not in a hospital admission)  [6]   Current Outpatient Medications   Medication Sig Dispense Refill    amLODIPine (Norvasc) 5 mg tablet Take 1 tablet (5 mg) by mouth once daily.      cetirizine (ZyrTEC) 10 mg tablet Take 1 tablet (10 mg) by mouth once daily.      cyanocobalamin (Vitamin B-12) 250 mcg tablet Take 1 tablet (250 mcg) by mouth once daily.      dorzolamide-timoloL (Cosopt) 22.3-6.8 mg/mL ophthalmic solution Administer 1 drop into affected eye(s) twice a day.      EPINEPHrine (Epipen) 0.3 mg/0.3 mL injection syringe Inject 0.3 mL (0.3 mg) into the muscle 1 time if needed for anaphylaxis for up to 2 doses. Inject into upper leg. Call 911 after use. 2 each 0    escitalopram (Lexapro) 5 mg tablet Take 1 tablet (5 mg) by mouth once daily.      hydrocortisone butyrate 0.1 % lotion Apply 1 Application topically 2 times a day. 59 mL 0    latanoprost (Xalatan) 0.005 % ophthalmic solution Administer 1 drop into both eyes once daily at bedtime.      losartan (Cozaar) 100 mg tablet Take 1 tablet (100 mg) by mouth once daily.      metFORMIN (Glucophage) 500 mg tablet Take 1 tablet (500 mg) by mouth 2 times daily (morning and late afternoon). 60 tablet 0    multivitamin tablet Take 1 tablet by mouth once daily.      pantoprazole (ProtoNix) 40 mg EC tablet Take 1 tablet (40 mg) by mouth 2 times a day. Do not crush, chew, or split. Do not fill before July 15, 2025. 60 tablet 0    psyllium (MetamuciL Fiber, aspartame,) 3.4 gram packet Mix 1  packet in liquid then take by mouth once daily as directed 30 packet 0    rosuvastatin (Crestor) 5 mg tablet Take 1 tablet (5 mg) by mouth 3 (three) times a week.       No current facility-administered medications for this encounter.

## 2025-07-29 LAB
ATRIAL RATE: 86 BPM
P AXIS: 66 DEGREES
P OFFSET: 175 MS
P ONSET: 123 MS
PR INTERVAL: 196 MS
Q ONSET: 221 MS
QRS COUNT: 14 BEATS
QRS DURATION: 72 MS
QT INTERVAL: 378 MS
QTC CALCULATION(BAZETT): 452 MS
QTC FREDERICIA: 426 MS
R AXIS: 47 DEGREES
T AXIS: 36 DEGREES
T OFFSET: 410 MS
VENTRICULAR RATE: 86 BPM

## 2025-07-31 LAB
LABORATORY COMMENT REPORT: NORMAL
PATH REPORT.FINAL DX SPEC: NORMAL
PATH REPORT.GROSS SPEC: NORMAL
PATH REPORT.TOTAL CANCER: NORMAL